# Patient Record
Sex: FEMALE | Race: WHITE | Employment: OTHER | URBAN - NONMETROPOLITAN AREA
[De-identification: names, ages, dates, MRNs, and addresses within clinical notes are randomized per-mention and may not be internally consistent; named-entity substitution may affect disease eponyms.]

---

## 2017-04-10 ENCOUNTER — HOSPITAL ENCOUNTER (OUTPATIENT)
Dept: VASCULAR LAB | Age: 77
Discharge: HOME | End: 2017-04-10
Payer: MEDICARE

## 2017-04-10 ENCOUNTER — OFFICE VISIT (OUTPATIENT)
Dept: VASCULAR SURGERY | Age: 77
End: 2017-04-10
Payer: MEDICARE

## 2017-04-10 VITALS
DIASTOLIC BLOOD PRESSURE: 64 MMHG | HEART RATE: 84 BPM | RESPIRATION RATE: 18 BRPM | SYSTOLIC BLOOD PRESSURE: 127 MMHG | TEMPERATURE: 96.6 F

## 2017-04-10 DIAGNOSIS — I65.23 BILATERAL CAROTID ARTERY STENOSIS: ICD-10-CM

## 2017-04-10 DIAGNOSIS — I65.23 BILATERAL CAROTID ARTERY STENOSIS: Primary | ICD-10-CM

## 2017-04-10 PROCEDURE — 1123F ACP DISCUSS/DSCN MKR DOCD: CPT | Performed by: PHYSICIAN ASSISTANT

## 2017-04-10 PROCEDURE — 99213 OFFICE O/P EST LOW 20 MIN: CPT | Performed by: PHYSICIAN ASSISTANT

## 2017-04-10 PROCEDURE — 93880 EXTRACRANIAL BILAT STUDY: CPT

## 2017-04-10 PROCEDURE — G8598 ASA/ANTIPLAT THER USED: HCPCS | Performed by: PHYSICIAN ASSISTANT

## 2017-04-10 PROCEDURE — 3017F COLORECTAL CA SCREEN DOC REV: CPT | Performed by: PHYSICIAN ASSISTANT

## 2017-04-10 PROCEDURE — 4040F PNEUMOC VAC/ADMIN/RCVD: CPT | Performed by: PHYSICIAN ASSISTANT

## 2017-04-10 PROCEDURE — G8400 PT W/DXA NO RESULTS DOC: HCPCS | Performed by: PHYSICIAN ASSISTANT

## 2017-04-10 PROCEDURE — G8421 BMI NOT CALCULATED: HCPCS | Performed by: PHYSICIAN ASSISTANT

## 2017-04-10 PROCEDURE — 1090F PRES/ABSN URINE INCON ASSESS: CPT | Performed by: PHYSICIAN ASSISTANT

## 2017-04-10 PROCEDURE — G8427 DOCREV CUR MEDS BY ELIG CLIN: HCPCS | Performed by: PHYSICIAN ASSISTANT

## 2017-04-10 PROCEDURE — 4004F PT TOBACCO SCREEN RCVD TLK: CPT | Performed by: PHYSICIAN ASSISTANT

## 2018-04-16 ENCOUNTER — HOSPITAL ENCOUNTER (OUTPATIENT)
Dept: VASCULAR LAB | Age: 78
Discharge: HOME | End: 2018-04-16
Payer: MEDICARE

## 2018-04-16 ENCOUNTER — OFFICE VISIT (OUTPATIENT)
Dept: VASCULAR SURGERY | Age: 78
End: 2018-04-16
Payer: MEDICARE

## 2018-04-16 VITALS
HEART RATE: 87 BPM | RESPIRATION RATE: 18 BRPM | DIASTOLIC BLOOD PRESSURE: 88 MMHG | BODY MASS INDEX: 23.56 KG/M2 | SYSTOLIC BLOOD PRESSURE: 158 MMHG | HEIGHT: 60 IN | WEIGHT: 120 LBS

## 2018-04-16 DIAGNOSIS — I65.23 BILATERAL CAROTID ARTERY STENOSIS: ICD-10-CM

## 2018-04-16 DIAGNOSIS — I65.23 BILATERAL CAROTID ARTERY STENOSIS: Primary | ICD-10-CM

## 2018-04-16 PROCEDURE — G8427 DOCREV CUR MEDS BY ELIG CLIN: HCPCS | Performed by: NURSE PRACTITIONER

## 2018-04-16 PROCEDURE — 99212 OFFICE O/P EST SF 10 MIN: CPT | Performed by: NURSE PRACTITIONER

## 2018-04-16 PROCEDURE — 93880 EXTRACRANIAL BILAT STUDY: CPT

## 2018-04-16 PROCEDURE — 4040F PNEUMOC VAC/ADMIN/RCVD: CPT | Performed by: NURSE PRACTITIONER

## 2018-04-16 PROCEDURE — 4004F PT TOBACCO SCREEN RCVD TLK: CPT | Performed by: NURSE PRACTITIONER

## 2018-04-16 PROCEDURE — 1090F PRES/ABSN URINE INCON ASSESS: CPT | Performed by: NURSE PRACTITIONER

## 2018-04-16 PROCEDURE — 1123F ACP DISCUSS/DSCN MKR DOCD: CPT | Performed by: NURSE PRACTITIONER

## 2018-04-16 PROCEDURE — G8400 PT W/DXA NO RESULTS DOC: HCPCS | Performed by: NURSE PRACTITIONER

## 2018-04-16 PROCEDURE — G8420 CALC BMI NORM PARAMETERS: HCPCS | Performed by: NURSE PRACTITIONER

## 2018-04-16 PROCEDURE — G8598 ASA/ANTIPLAT THER USED: HCPCS | Performed by: NURSE PRACTITIONER

## 2018-04-16 RX ORDER — LEVOTHYROXINE SODIUM 0.03 MG/1
75 TABLET ORAL DAILY
COMMUNITY

## 2018-04-16 RX ORDER — OLMESARTAN MEDOXOMIL AND HYDROCHLOROTHIAZIDE 40/25 40; 25 MG/1; MG/1
1 TABLET ORAL DAILY
COMMUNITY

## 2019-04-22 ENCOUNTER — TELEPHONE (OUTPATIENT)
Dept: VASCULAR SURGERY | Age: 79
End: 2019-04-22

## 2019-04-22 ENCOUNTER — HOSPITAL ENCOUNTER (OUTPATIENT)
Dept: VASCULAR LAB | Age: 79
Discharge: HOME | End: 2019-04-22
Payer: MEDICARE

## 2019-04-22 DIAGNOSIS — I65.23 BILATERAL CAROTID ARTERY STENOSIS: ICD-10-CM

## 2019-04-22 DIAGNOSIS — I65.23 BILATERAL CAROTID ARTERY STENOSIS: Primary | ICD-10-CM

## 2019-04-22 PROCEDURE — 93880 EXTRACRANIAL BILAT STUDY: CPT

## 2019-12-09 RX ORDER — LEVOTHYROXINE SODIUM 0.05 MG/1
50 TABLET ORAL DAILY
COMMUNITY

## 2019-12-09 RX ORDER — ROSUVASTATIN CALCIUM 10 MG/1
10 TABLET, COATED ORAL DAILY
COMMUNITY

## 2019-12-09 RX ORDER — OLMESARTAN MEDOXOMIL AND HYDROCHLOROTHIAZIDE 40/12.5 40; 12.5 MG/1; MG/1
1 TABLET ORAL DAILY
COMMUNITY

## 2019-12-16 NOTE — PROGRESS NOTES
Chief Complaint   Patient presents with   • Elevated Hepatic Enzymes     abdnormal lft's       PCP: Steffi Yu MD  REFER: Steffi Yu MD    Subjective     HPI    Patient referred to office for elevated LFT in Oct 2019.  Elevation of LFT was a new diagnosis for patient in Oct 2019.  States she has yearly lab work.  Occasional LUQ pain.  She has had decreased appetite and weight loss of 10-15 lb in last year.  Appetite is returning.  No heartburn or indigestion.  No bright red blood per rectum, no melena.  Bowels described as moving regular without difficulty.  Last colonoscopy over 10 years ago, she has been doing yearly stool study by PCP.  No family history liver disease.  No new medications/vitamins.  No etoh  Use.  Hepatitis testing negative by PCP.  Denies frequent use of NSAIDs.     Labs (referral note dated Oct 2019)  Ceruplasmin - elevated at 44      Alk PHos - 221  T Bili 0.4    Weight 120 lb in 4/2018    US dated Nov 2019 - pancreatic duct dilated 6 mm at pancreatic head    Past Medical History:   Diagnosis Date   • Disease of thyroid gland    • Hyperlipidemia    • Hypertension        Past Surgical History:   Procedure Laterality Date   • HYSTERECTOMY         Outpatient Medications Marked as Taking for the 12/17/19 encounter (Office Visit) with Gunnar Hope APRN   Medication Sig Dispense Refill   • levothyroxine (SYNTHROID, LEVOTHROID) 50 MCG tablet Take 50 mcg by mouth Daily.     • olmesartan-hydrochlorothiazide (BENICAR HCT) 40-12.5 MG per tablet Take 1 tablet by mouth Daily.     • rosuvastatin (CRESTOR) 10 MG tablet Take 10 mg by mouth Daily.     • traMADol (ULTRAM) 50 MG tablet TAKE ONE TABLET EVERY 8 HOURS AS NEEDED  0       Allergies   Allergen Reactions   • Accupril [Quinapril Hcl] Other (See Comments)     unsure   • Daypro [Oxaprozin] Other (See Comments)     unsure   • Norco [Hydrocodone-Acetaminophen] Other (See Comments)     unsure       Social History  "    Socioeconomic History   • Marital status:      Spouse name: Not on file   • Number of children: Not on file   • Years of education: Not on file   • Highest education level: Not on file   Tobacco Use   • Smoking status: Current Every Day Smoker     Packs/day: 0.25   • Smokeless tobacco: Never Used   • Tobacco comment: 7-8 cigaretts a day   Substance and Sexual Activity   • Alcohol use: Not Currently   • Drug use: Never       Family History   Problem Relation Age of Onset   • Colon cancer Neg Hx    • Colon polyps Neg Hx    • Esophageal cancer Neg Hx        Review of Systems   Constitutional: Negative for fatigue, fever and unexpected weight change.   HENT: Negative for hearing loss, sore throat and voice change.    Eyes: Negative for visual disturbance.   Respiratory: Negative for cough, shortness of breath and wheezing.    Cardiovascular: Negative for chest pain and palpitations.   Gastrointestinal: Positive for abdominal pain. Negative for blood in stool and vomiting.   Endocrine: Negative for polydipsia and polyuria.   Genitourinary: Negative for difficulty urinating, dysuria, hematuria and urgency.   Musculoskeletal: Negative for joint swelling and myalgias.   Skin: Negative for color change, rash and wound.   Neurological: Negative for dizziness, tremors, seizures and syncope.   Hematological: Does not bruise/bleed easily.   Psychiatric/Behavioral: Negative for agitation and confusion. The patient is not nervous/anxious.        Objective     Vitals:    12/17/19 1250   BP: 120/70   Pulse: 96   Temp: 97 °F (36.1 °C)   SpO2: 98%   Weight: 49 kg (108 lb)   Height: 152.4 cm (60\")     Body mass index is 21.09 kg/m².    Physical Exam   Constitutional: She is oriented to person, place, and time. She appears well-developed and well-nourished. She is cooperative.   HENT:   Head: Normocephalic and atraumatic.   Eyes: Pupils are equal, round, and reactive to light. Conjunctivae are normal. No scleral icterus. "   Neck: Normal range of motion. Neck supple. No JVD present. No thyroid mass and no thyromegaly present.   Cardiovascular: Normal rate, regular rhythm and normal heart sounds. Exam reveals no gallop and no friction rub.   No murmur heard.  Pulmonary/Chest: Effort normal and breath sounds normal. No accessory muscle usage. No respiratory distress. She has no wheezes. She has no rales.   Abdominal: Soft. Normal appearance and bowel sounds are normal. She exhibits no distension, no ascites and no mass. There is no hepatosplenomegaly. There is no tenderness. There is no rebound and no guarding.   Musculoskeletal: Normal range of motion. She exhibits no edema or tenderness.     Vascular Status -  Her right foot exhibits normal foot vasculature  and no edema. Her left foot exhibits normal foot vasculature  and no edema.  Lymphadenopathy:     She has no cervical adenopathy.   Neurological: She is alert and oriented to person, place, and time. She has normal strength. Gait normal.   Skin: Skin is warm, dry and intact. No rash noted.       Imaging Results (Most Recent)     None          Body mass index is 21.09 kg/m².    Assessment/Plan     Dionne was seen today for elevated hepatic enzymes.    Diagnoses and all orders for this visit:    Abnormal ultrasound  -     Cancel: MRI Abdomen With & Without Contrast; Future  -     MRI Abdomen With & Without Contrast; Future    Elevated LFTs  -     Alpha - 1 - Antitrypsin  -     Anti-Smooth Muscle Antibody Titer; Future  -     Ferritin  -     Iron Profile  -     Mitochondrial Antibodies, M2  -     Nuclear Antigen Antibody, IFA; Future    Abnormal finding of blood chemistry, unspecified   -     Ferritin  -     Iron Profile    Other orders  -     predniSONE (DELTASONE) 50 MG tablet; Take 1 tablet by mouth Take As Directed. One tablet 13 hours prior to procedure, one tablet 7 hours prior, one tablet one hour before  -     diphenhydrAMINE (BENADRYL ALLERGY) 25 MG tablet; Take 2 tablets by  mouth Take As Directed. Take one hour prior to procedure with last dose of prednisone        * Surgery not found *    Patient states she has had reaction to MRI many years ago and was pre medicated prior to last MRI in 2016 and did not have difficulty.    Case discussed with Dr Hudson, treatment plan agree upon  Further orders/recommendation pending finding of MRI/lab  I do recommend colonoscopy evaluation, however, timing to be discussed after results of testing      There are no Patient Instructions on file for this visit.

## 2019-12-17 ENCOUNTER — LAB (OUTPATIENT)
Dept: LAB | Facility: HOSPITAL | Age: 79
End: 2019-12-17

## 2019-12-17 ENCOUNTER — OFFICE VISIT (OUTPATIENT)
Dept: GASTROENTEROLOGY | Facility: CLINIC | Age: 79
End: 2019-12-17

## 2019-12-17 VITALS
SYSTOLIC BLOOD PRESSURE: 120 MMHG | OXYGEN SATURATION: 98 % | DIASTOLIC BLOOD PRESSURE: 70 MMHG | HEIGHT: 60 IN | TEMPERATURE: 97 F | HEART RATE: 96 BPM | BODY MASS INDEX: 21.2 KG/M2 | WEIGHT: 108 LBS

## 2019-12-17 DIAGNOSIS — R79.89 ELEVATED LFTS: ICD-10-CM

## 2019-12-17 DIAGNOSIS — R93.89 ABNORMAL ULTRASOUND: Primary | ICD-10-CM

## 2019-12-17 DIAGNOSIS — R79.9 ABNORMAL FINDING OF BLOOD CHEMISTRY, UNSPECIFIED: ICD-10-CM

## 2019-12-17 PROCEDURE — 99204 OFFICE O/P NEW MOD 45 MIN: CPT | Performed by: NURSE PRACTITIONER

## 2019-12-17 PROCEDURE — 84466 ASSAY OF TRANSFERRIN: CPT | Performed by: NURSE PRACTITIONER

## 2019-12-17 PROCEDURE — 36415 COLL VENOUS BLD VENIPUNCTURE: CPT | Performed by: NURSE PRACTITIONER

## 2019-12-17 PROCEDURE — 83516 IMMUNOASSAY NONANTIBODY: CPT | Performed by: NURSE PRACTITIONER

## 2019-12-17 PROCEDURE — 82103 ALPHA-1-ANTITRYPSIN TOTAL: CPT | Performed by: NURSE PRACTITIONER

## 2019-12-17 PROCEDURE — 83540 ASSAY OF IRON: CPT | Performed by: NURSE PRACTITIONER

## 2019-12-17 PROCEDURE — 82728 ASSAY OF FERRITIN: CPT | Performed by: NURSE PRACTITIONER

## 2019-12-17 PROCEDURE — 86038 ANTINUCLEAR ANTIBODIES: CPT | Performed by: NURSE PRACTITIONER

## 2019-12-17 RX ORDER — PREDNISONE 50 MG/1
50 TABLET ORAL TAKE AS DIRECTED
Qty: 3 TABLET | Refills: 0 | Status: SHIPPED | OUTPATIENT
Start: 2019-12-17

## 2019-12-17 RX ORDER — DIPHENHYDRAMINE HCL 25 MG
50 TABLET ORAL TAKE AS DIRECTED
Qty: 2 TABLET | Refills: 0 | Status: SHIPPED | OUTPATIENT
Start: 2019-12-17

## 2019-12-17 RX ORDER — TRAMADOL HYDROCHLORIDE 50 MG/1
TABLET ORAL
Refills: 0 | COMMUNITY
Start: 2019-11-05

## 2019-12-18 LAB
ACTIN IGG SERPL-ACNC: 6 UNITS (ref 0–19)
ALPHA1 GLOB MFR UR ELPH: 187 MG/DL (ref 90–200)
DEPRECATED MITOCHONDRIA M2 IGG SER-ACNC: <20 UNITS (ref 0–20)
FERRITIN SERPL-MCNC: 143 NG/ML (ref 13–150)
IRON 24H UR-MRATE: 56 MCG/DL (ref 37–145)
IRON SATN MFR SERPL: 13 % (ref 20–50)
TIBC SERPL-MCNC: 431 MCG/DL (ref 298–536)
TRANSFERRIN SERPL-MCNC: 289 MG/DL (ref 200–360)

## 2019-12-19 ENCOUNTER — HOSPITAL ENCOUNTER (OUTPATIENT)
Dept: MRI IMAGING | Facility: HOSPITAL | Age: 79
Discharge: HOME | End: 2019-12-19
Admitting: NURSE PRACTITIONER

## 2019-12-19 DIAGNOSIS — R93.89 ABNORMAL ULTRASOUND: ICD-10-CM

## 2019-12-19 LAB
ANA SER QL IA: NEGATIVE
CREAT BLDA-MCNC: 0.8 MG/DL (ref 0.6–1.3)

## 2019-12-19 PROCEDURE — 74183 MRI ABD W/O CNTR FLWD CNTR: CPT

## 2019-12-19 PROCEDURE — A9577 INJ MULTIHANCE: HCPCS | Performed by: NURSE PRACTITIONER

## 2019-12-19 PROCEDURE — 82565 ASSAY OF CREATININE: CPT

## 2019-12-19 PROCEDURE — 0 GADOBENATE DIMEGLUMINE 529 MG/ML SOLUTION: Performed by: NURSE PRACTITIONER

## 2019-12-19 RX ADMIN — GADOBENATE DIMEGLUMINE 10 ML: 529 INJECTION, SOLUTION INTRAVENOUS at 11:53

## 2019-12-20 ENCOUNTER — TELEPHONE (OUTPATIENT)
Dept: GASTROENTEROLOGY | Facility: CLINIC | Age: 79
End: 2019-12-20

## 2019-12-20 NOTE — PROGRESS NOTES
Think she will need an OV with me (hopefully next Monday) to discuss her recent MRI/elevated LFT's  Could u check with her and add her into my schedule?

## 2019-12-20 NOTE — TELEPHONE ENCOUNTER
----- Message from Tad Hudson, DO sent at 12/20/2019  7:18 AM CST -----  Think she will need an OV with me (hopefully next Monday) to discuss her recent MRI/elevated LFT's  Could u check with her and add her into my schedule?

## 2019-12-23 ENCOUNTER — OFFICE VISIT (OUTPATIENT)
Dept: GASTROENTEROLOGY | Facility: CLINIC | Age: 79
End: 2019-12-23

## 2019-12-23 VITALS
HEIGHT: 60 IN | HEART RATE: 94 BPM | WEIGHT: 107 LBS | OXYGEN SATURATION: 99 % | DIASTOLIC BLOOD PRESSURE: 82 MMHG | SYSTOLIC BLOOD PRESSURE: 130 MMHG | BODY MASS INDEX: 21.01 KG/M2

## 2019-12-23 DIAGNOSIS — R63.4 WEIGHT LOSS: ICD-10-CM

## 2019-12-23 DIAGNOSIS — R93.89 ABNORMAL MRI: ICD-10-CM

## 2019-12-23 DIAGNOSIS — R79.89 ELEVATED LFTS: Primary | ICD-10-CM

## 2019-12-23 PROCEDURE — 99214 OFFICE O/P EST MOD 30 MIN: CPT | Performed by: INTERNAL MEDICINE

## 2019-12-23 NOTE — PROGRESS NOTES
Chief Complaint   Patient presents with   • GI Problem     elevated LFTS/discuss MRI results       PCP: Steffi Yu MD  REFER: No ref. provider found    Subjective     HPI    Eval in office 12/17/19 for abnormal US and new finding of elevation in LFT.  Follow up MRI showed dilation of pancreatic duct.   No etoh use.  Cigarette use daily.  No abdominal pain. No nausea or vomiting.  Wt reported at 120 lb in 4/2018.  Further liver serology negative.     Labs (referral note dated Oct 2019)  Ceruplasmin - elevated at 44      Alk PHos - 221  T Bili 0.4      US dated Nov 2019 - pancreatic duct dilated 6 mm at pancreatic head    Past Medical History:   Diagnosis Date   • Disease of thyroid gland    • Hyperlipidemia    • Hypertension        Past Surgical History:   Procedure Laterality Date   • HYSTERECTOMY         Outpatient Medications Marked as Taking for the 12/23/19 encounter (Office Visit) with Tad Hudson, DO   Medication Sig Dispense Refill   • levothyroxine (SYNTHROID, LEVOTHROID) 50 MCG tablet Take 50 mcg by mouth Daily.     • olmesartan-hydrochlorothiazide (BENICAR HCT) 40-12.5 MG per tablet Take 1 tablet by mouth Daily.     • rosuvastatin (CRESTOR) 10 MG tablet Take 10 mg by mouth Daily.         Allergies   Allergen Reactions   • Accupril [Quinapril Hcl] Other (See Comments)     unsure   • Contrast Dye Other (See Comments)     PT states she had reaction years ago-turned bright red   • Daypro [Oxaprozin] Other (See Comments)     unsure   • Norco [Hydrocodone-Acetaminophen] Other (See Comments)     unsure       Social History     Socioeconomic History   • Marital status:      Spouse name: Not on file   • Number of children: Not on file   • Years of education: Not on file   • Highest education level: Not on file   Tobacco Use   • Smoking status: Current Every Day Smoker     Packs/day: 0.25   • Smokeless tobacco: Never Used   • Tobacco comment: 7-8 cigaretts a day   Substance and  "Sexual Activity   • Alcohol use: Not Currently   • Drug use: Never       Family History   Problem Relation Age of Onset   • Colon cancer Neg Hx    • Colon polyps Neg Hx    • Esophageal cancer Neg Hx        Review of Systems   Constitutional: Positive for unexpected weight change. Negative for fatigue and fever.   HENT: Negative for hearing loss, sore throat and voice change.    Eyes: Negative for visual disturbance.   Respiratory: Negative for cough, shortness of breath and wheezing.    Cardiovascular: Negative for chest pain and palpitations.   Gastrointestinal: Negative for abdominal pain, blood in stool and vomiting.   Endocrine: Negative for polydipsia and polyuria.   Genitourinary: Negative for difficulty urinating, dysuria, hematuria and urgency.   Musculoskeletal: Negative for joint swelling and myalgias.   Skin: Negative for color change, rash and wound.   Neurological: Negative for dizziness, tremors, seizures and syncope.   Hematological: Does not bruise/bleed easily.   Psychiatric/Behavioral: Negative for agitation and confusion. The patient is not nervous/anxious.        Objective     Vitals:    12/23/19 1317   BP: 130/82   Pulse: 94   SpO2: 99%   Weight: 48.5 kg (107 lb)   Height: 152.4 cm (60\")     Body mass index is 20.9 kg/m².    Physical Exam   Constitutional: She is oriented to person, place, and time. She appears well-developed and well-nourished. She is cooperative.   HENT:   Head: Normocephalic and atraumatic.   Eyes: Pupils are equal, round, and reactive to light. Conjunctivae are normal. No scleral icterus.   Neck: Normal range of motion. Neck supple. No JVD present. No thyroid mass and no thyromegaly present.   Cardiovascular: Normal rate, regular rhythm and normal heart sounds. Exam reveals no gallop and no friction rub.   No murmur heard.  Pulmonary/Chest: Effort normal and breath sounds normal. No accessory muscle usage. No respiratory distress. She has no wheezes. She has no rales. "   Abdominal: Soft. Normal appearance and bowel sounds are normal. She exhibits no distension, no ascites and no mass. There is no hepatosplenomegaly. There is no tenderness. There is no rebound and no guarding.   Musculoskeletal: Normal range of motion. She exhibits no edema or tenderness.     Vascular Status -  Her right foot exhibits normal foot vasculature  and no edema. Her left foot exhibits normal foot vasculature  and no edema.  Lymphadenopathy:     She has no cervical adenopathy.   Neurological: She is alert and oriented to person, place, and time. She has normal strength. Gait normal.   Skin: Skin is warm, dry and intact. No rash noted.       Imaging Results (Most Recent)     None          Body mass index is 20.9 kg/m².    Assessment/Plan     Dionne was seen today for gi problem.    Diagnoses and all orders for this visit:    Elevated LFTs    Abnormal MRI  -     Ambulatory Referral to Gastroenterology    Weight loss      Explained a mass was not visualized on MRI, however, with dilation of ducts and abnormal labs recommend EUS-will refer to Dr Lazaro  Strongly encourage smoking cessation    * Surgery not found *          There are no Patient Instructions on file for this visit.

## 2020-01-24 ENCOUNTER — TELEPHONE (OUTPATIENT)
Dept: GASTROENTEROLOGY | Facility: CLINIC | Age: 80
End: 2020-01-24

## 2020-01-24 NOTE — TELEPHONE ENCOUNTER
Note from dr omreira office dated 1/14/2020    Referral for elevated LFT   Abnormal MRI    Anticipate EUS

## 2020-01-31 ENCOUNTER — TELEPHONE (OUTPATIENT)
Dept: GASTROENTEROLOGY | Facility: CLINIC | Age: 80
End: 2020-01-31

## 2020-03-23 ENCOUNTER — TELEPHONE (OUTPATIENT)
Dept: HEMATOLOGY | Age: 80
End: 2020-03-23

## 2020-04-03 RX ORDER — FAMOTIDINE 10 MG
10 TABLET ORAL 2 TIMES DAILY
COMMUNITY

## 2020-04-27 ENCOUNTER — VIRTUAL VISIT (OUTPATIENT)
Dept: VASCULAR SURGERY | Age: 80
End: 2020-04-27
Payer: MEDICARE

## 2020-04-27 PROBLEM — C80.1 CANCER (HCC): Status: ACTIVE | Noted: 2020-04-27

## 2020-04-27 PROCEDURE — 99441 PR PHYS/QHP TELEPHONE EVALUATION 5-10 MIN: CPT | Performed by: NURSE PRACTITIONER

## 2020-04-27 NOTE — PROGRESS NOTES
ABDOMINAL      PANCREAS SURGERY      whipple    SKIN CANCER EXCISION      right arm     Family History   Problem Relation Age of Onset    Other Mother         Pneumonia,  of   Heddie Catena Hypertension Father     Other Father          of cerebral bleed    Other Sister          of pneumonia     Social History     Tobacco Use    Smoking status: Current Every Day Smoker     Packs/day: 0.50     Years: 47.00     Pack years: 23.50    Smokeless tobacco: Never Used    Tobacco comment: now smoking 5-7 cigs per day x 1 year   Substance Use Topics    Alcohol use: No         Review of Systems    Constitutional - no significant activity change, appetite change, or unexpected weight change. No fever or chills. No diaphoresis or significant fatigue. HENT - no significant rhinorrhea or epistaxis. No tinnitus or significant hearing loss. Eyes - no sudden vision change or amaurosis. Respiratory - no significant shortness of breath, wheezing, or stridor. No apnea, cough, or chest tightness associated with shortness of breath. Cardiovascular - no chest pain, syncope, or significant dizziness. No palpitations or significant leg swelling. No claudication. Gastrointestinal - has not had abdominal swelling or pain. No blood in stool. No severe constipation, diarrhea, nausea, or vomiting. Genitourinary - No difficulty urinating, dysuria, frequency, or urgency. No flank pain or hematuria. Musculoskeletal - has not had back pain, gait disturbance, or myalgia. Skin - no color change, rash, pallor, or new wound. Neurologic - no dizziness, facial asymmetry, or light headedness. No seizures. No speech difficulty or lateralizing weakness. Hematologic - no easy bruising or excessive bleeding. Psychiatric - no severe anxiety or nervousness. No confusion. All other review of systems are negative.     PHYSICAL EXAMINATION:    [ INSTRUCTIONS:  \"[x]\" Indicates a positive item  \"[]\" Indicates a negative item  -- DELETE

## 2020-06-01 ENCOUNTER — VIRTUAL VISIT (OUTPATIENT)
Dept: VASCULAR SURGERY | Age: 80
End: 2020-06-01
Payer: MEDICARE

## 2020-06-01 ENCOUNTER — TELEPHONE (OUTPATIENT)
Dept: VASCULAR SURGERY | Age: 80
End: 2020-06-01

## 2020-06-01 PROCEDURE — 99441 PR PHYS/QHP TELEPHONE EVALUATION 5-10 MIN: CPT | Performed by: NURSE PRACTITIONER

## 2020-06-01 NOTE — PROGRESS NOTES
Robert Schmidt is a 79 y.o. female evaluated via telephone on 6/1/2020. Consent:  She and/or health care decision maker is aware that that she may receive a bill for this telephone service, depending on her insurance coverage, and has provided verbal consent to proceed: Yes    Patient is located at home  Provider is located at [facility]   Also present during call is     She presents for follow up of carotid artery stenosis. She has a known history of carotid artery stenosis for 1 - 5 years. Her current treatment includes ASA EC daily. She denies a history of CVA. She reports no TIA's, episodes of lateralizing weakness and episodes of amaurosis fugax. Robert Schmidt is a 79 y.o. female with the following history reviewed and recorded in [employer]:  Patient Active Problem List    Diagnosis Date Noted    Cancer ([facility] 04/27/2020     Bile duct, cancer, liver      Res[facility] ([facility] 03/10/2014    Carotid artery stenosis 03/07/2013    Hypertension     Substance abuse ([facility])      tobacco       Current Outpatient Medications   Medication Sig Dispense Refill    famotidine (PEPCID) 10 MG tablet Take 10 mg by mouth 2 times daily      olmesartan-hydrochlorothiazide (BENICAR HCT) 40-25 MG per tablet Take 1 tablet by mouth daily      levothyroxine (SYNTHROID) 25 MCG tablet Take 50 mcg by mouth Daily       aspirin 81 MG EC tablet Take 81 mg by mouth daily.  rosuvastatin (CRESTOR) 10 MG tablet Take 10 mg by mouth daily. No current facility-administered medications for this visit. Allergies: Daypro [oxaprozin];  Iodides; and Quinapril hcl  Past Medical History:   Diagnosis Date    Anemia     Carotid artery occlusion     Hyperlipidemia     Hypertension     Respiratory failure ([facility] 3/10/2014    Substance abuse ([facility]     tobacco     Past Surgical History:   Procedure Laterality Date    CAROTID ENDARTERECTOMY  [address], 03/09/09    LCE w/Dacron patch angioplasty    HYSTERECTOMY, negative item  -- DELETE ALL ITEMS NOT EXAMINED]    [x] Alert  [x] Oriented to person/place/time    [x] No apparent distress  [] Toxic appearing  [x] Normal Mood  [] Anxious appearing    [] Depressed appearing  [] Confused appearing      [] Poor short term memory  [] Poor long term memory   Memory appears to be intact           Assessment    1. Bilateral carotid artery stenosis          Plan      Strongly encouraged start/continue statin therapy  Recommended no smoking  Patient instructed to call or proceed to the emergency room with any symptoms of lateralizing weakness, loss of vision in one eye, or episodes slurred speech. Will hold off on study due to covid  Asa ec daily  Follow up in 6 months  Documentation:  I communicated with the patient and/or health care decision maker about carotid artery stenosis. Details of this discussion including any medical advice provided: as above      I affirm this is a Patient Initiated Episode with an Established Patient who has not had a related appointment within my department in the past 7 days or scheduled within the next 24 hours.     Total Time: minutes: 5-10 minutes    Note: not billable if this call serves to triage the patient into an appointment for the relevant concern      [facility]

## 2020-09-01 ENCOUNTER — OFFICE VISIT (OUTPATIENT)
Dept: HEMATOLOGY | Age: 80
End: 2020-09-01
Payer: MEDICARE

## 2020-09-01 ENCOUNTER — HOSPITAL ENCOUNTER (OUTPATIENT)
Dept: INFUSION THERAPY | Age: 80
Discharge: HOME | End: 2020-09-01
Payer: MEDICARE

## 2020-09-01 ENCOUNTER — CLINICAL DOCUMENTATION (OUTPATIENT)
Dept: HEMATOLOGY | Age: 80
End: 2020-09-01

## 2020-09-01 VITALS
SYSTOLIC BLOOD PRESSURE: 120 MMHG | BODY MASS INDEX: 18.65 KG/M2 | HEIGHT: 61 IN | HEART RATE: 71 BPM | OXYGEN SATURATION: 98 % | WEIGHT: 98.8 LBS | TEMPERATURE: 97.1 F | DIASTOLIC BLOOD PRESSURE: 82 MMHG

## 2020-09-01 DIAGNOSIS — C80.1 CANCER (HCC): ICD-10-CM

## 2020-09-01 DIAGNOSIS — C25.9 PANCREATIC ADENOCARCINOMA (HCC): ICD-10-CM

## 2020-09-01 LAB
ALBUMIN SERPL-MCNC: 4.6 G/DL (ref 3.5–5.2)
ALP BLD-CCNC: 95 U/L (ref 35–104)
ALT SERPL-CCNC: 31 U/L (ref 9–52)
ANION GAP SERPL CALCULATED.3IONS-SCNC: 11 MMOL/L (ref 7–19)
AST SERPL-CCNC: 37 U/L (ref 14–36)
BASOPHILS ABSOLUTE: 0.04 K/UL (ref 0.01–0.08)
BASOPHILS RELATIVE PERCENT: 0.4 % (ref 0.1–1.2)
BILIRUB SERPL-MCNC: 0.5 MG/DL (ref 0.2–1.3)
BUN BLDV-MCNC: 21 MG/DL (ref 7–17)
CA 19-9: 1 U/ML (ref 0–37)
CALCIUM SERPL-MCNC: 10.3 MG/DL (ref 8.4–10.2)
CEA: 4.9 NG/ML (ref 0–3)
CHLORIDE BLD-SCNC: 103 MMOL/L (ref 98–111)
CO2: 29 MMOL/L (ref 22–29)
CREAT SERPL-MCNC: 0.8 MG/DL (ref 0.5–1)
EOSINOPHILS ABSOLUTE: 0.06 K/UL (ref 0.04–0.54)
EOSINOPHILS RELATIVE PERCENT: 0.6 % (ref 0.7–7)
GFR NON-AFRICAN AMERICAN: >60
GLOBULIN: 3.6 G/DL
GLUCOSE BLD-MCNC: 113 MG/DL (ref 74–106)
HCT VFR BLD CALC: 41.1 % (ref 34.1–44.9)
HEMOGLOBIN: 13.2 G/DL (ref 11.2–15.7)
LYMPHOCYTES ABSOLUTE: 2.51 K/UL (ref 1.18–3.74)
LYMPHOCYTES RELATIVE PERCENT: 24.2 % (ref 19.3–53.1)
MCH RBC QN AUTO: 29.7 PG (ref 25.6–32.2)
MCHC RBC AUTO-ENTMCNC: 32.1 G/DL (ref 32.3–35.5)
MCV RBC AUTO: 92.6 FL (ref 79.4–94.8)
MONOCYTES ABSOLUTE: 0.91 K/UL (ref 0.24–0.82)
MONOCYTES RELATIVE PERCENT: 8.8 % (ref 4.7–12.5)
NEUTROPHILS ABSOLUTE: 6.87 K/UL (ref 1.56–6.13)
NEUTROPHILS RELATIVE PERCENT: 66 % (ref 34–71.1)
PDW BLD-RTO: 17.4 % (ref 11.7–14.4)
PLATELET # BLD: 409 K/UL (ref 182–369)
PMV BLD AUTO: 9.5 FL (ref 7.4–10.4)
POTASSIUM SERPL-SCNC: 4 MMOL/L (ref 3.5–5.1)
RBC # BLD: 4.44 M/UL (ref 3.93–5.22)
SODIUM BLD-SCNC: 143 MMOL/L (ref 137–145)
TOTAL PROTEIN: 8.2 G/DL (ref 6.3–8.2)
WBC # BLD: 10.39 K/UL (ref 3.98–10.04)

## 2020-09-01 PROCEDURE — 80053 COMPREHEN METABOLIC PANEL: CPT

## 2020-09-01 PROCEDURE — 4040F PNEUMOC VAC/ADMIN/RCVD: CPT | Performed by: INTERNAL MEDICINE

## 2020-09-01 PROCEDURE — G8400 PT W/DXA NO RESULTS DOC: HCPCS | Performed by: INTERNAL MEDICINE

## 2020-09-01 PROCEDURE — 99205 OFFICE O/P NEW HI 60 MIN: CPT | Performed by: INTERNAL MEDICINE

## 2020-09-01 PROCEDURE — 1090F PRES/ABSN URINE INCON ASSESS: CPT | Performed by: INTERNAL MEDICINE

## 2020-09-01 PROCEDURE — 4004F PT TOBACCO SCREEN RCVD TLK: CPT | Performed by: INTERNAL MEDICINE

## 2020-09-01 PROCEDURE — 86301 IMMUNOASSAY TUMOR CA 19-9: CPT

## 2020-09-01 PROCEDURE — 85025 COMPLETE CBC W/AUTO DIFF WBC: CPT

## 2020-09-01 PROCEDURE — G8428 CUR MEDS NOT DOCUMENT: HCPCS | Performed by: INTERNAL MEDICINE

## 2020-09-01 PROCEDURE — 1123F ACP DISCUSS/DSCN MKR DOCD: CPT | Performed by: INTERNAL MEDICINE

## 2020-09-01 PROCEDURE — 99212 OFFICE O/P EST SF 10 MIN: CPT

## 2020-09-01 PROCEDURE — 82378 CARCINOEMBRYONIC ANTIGEN: CPT

## 2020-09-01 PROCEDURE — G8420 CALC BMI NORM PARAMETERS: HCPCS | Performed by: INTERNAL MEDICINE

## 2020-09-01 RX ORDER — DIPHENHYDRAMINE HCL 50 MG
CAPSULE ORAL
Qty: 1 CAPSULE | Refills: 0 | Status: SHIPPED | OUTPATIENT
Start: 2020-09-01 | End: 2021-06-08 | Stop reason: ALTCHOICE

## 2020-09-01 RX ORDER — PREDNISONE 50 MG/1
TABLET ORAL
Qty: 3 TABLET | Refills: 0 | Status: SHIPPED | OUTPATIENT
Start: 2020-09-01 | End: 2021-06-08 | Stop reason: ALTCHOICE

## 2020-09-01 NOTE — PROGRESS NOTES
Prednisone and Benadryl scripts sent to [facility] for CT scan prep due to allergy to IV dye.

## 2020-09-01 NOTE — PROGRESS NOTES
Patient:  Saima Green  YOB: 1940  Date of Service: 9/1/2020  MRN: [id number]   Primary Care Physician: Saritha Barrientos. 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 Subjective: Saima Green is a 79 y.o.  female referred by Dr. Zainab Tilley for opinion regarding adjuvant therapy with a diagnosis of resected stage IIA pancreatic cancer 2/25/2020 for adjuvant systemic chemotherapy considerations. 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 Zainab Tilley for opinion regarding adjuvant therapy with a diagnosis of resected stage IIA pancreatic cancer 2/25/2020 for adjuvant systemic chemotherapy considerations. Dr. Zainab Tilley had referred her to this office in March 2020. Our office tried to schedule her for an appointment on 3/23/2020 but Maxx Owens refused. When she presented today, she admitted that she did not feel good and at that time that she was originally referred and was convinced that she did not desire to have systemic chemotherapy and did not see the utility of making an appointment. Additionally, she was frightened during the first part of the COVID-19 pandemic and therefore declined to come for evaluation or opinion. Ms. Clement Euceda presents today, 9/1/2020, referred again by Dr. Zainab Tilley for medical oncology opinion regarding adjuvant systemic chemotherapy and medical oncology monitoring and opinion.     Her history is as follows:     US abdomen on 11/5/2019 at [facility] documented:  · Inhomogeneous appearance of liver without focal lesion  · Intra and extrahepatic biliary ductal dilatation   · Pancreatic duct dilated up to 5 mm. These findings might be further evaluated with MRCP/ERCP as deemed clinically warranted  · Sludge in the gallbladder  · Subcentimeter right renal cyst    MRI abdomen with and without contrast on 12/19/2019 at [facility] documented:  · Intra and extrahepatic biliary ductal dilatation with dilatation of the pancreatic duct. Etiology of the duct dilatation is uncertain, no definite distal duct lesion identified on this examination. . Conventional ERCP suggested versus CT imaging, pancreas mass protocol. · Gallbladder sludge. Small left nephrogenic cyst.    Biopsy of the ampulla mass was performed on 1/27/2020. Pathology revealed moderately differentiated adenocarcinoma. Whipple was performed on 2/25/2020 by Dr. 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 · Extends through muscularis propria into the clayton duodenal soft tissue  · Pancreas and common bile duct negative for dysplasia and malignancy  · Proximal, distal common bile duct, distal pancreas and retroperitoneal margins negative for malignancy  · 12 lymph nodes negative for malignancy    AJCC staging pT3, pN0, Mx, G2, moderately differentiated pancreaticobiliary adenocarcinoma    Physical examination at her initial visit on 9/1/2020 documents a very small but healthy appearing 98 pound woman in no acute distress. She does not have evidence of palpable supraclavicular infraclavicular axillary or inguinal lymphadenopathy.   Lungs are clear heart is regular abdomen is soft and benign with a or sore throat. Eyes: Negative for photophobia, pain, discharge, redness and visual disturbance. Respiratory: Negative for cough, shortness of breath, or wheezing. Cardiovascular: Negative for chest pain, palpitations or leg swelling. Gastrointestinal: Negative for abdominal pain, blood in stool, constipation, diarrhea, nausea or vomiting. Genitourinary: Negative for dysuria, flank pain, frequency, hematuria or urgency. Musculoskeletal: Negative for back pain, joint swelling, myalgias or neck pain. Skin: Negative for rash or petechiae. Neurological: Negative for tremors, seizures, syncope, weakness or headaches. Hematological: No active bruising or bleeding. Psychiatric/Behavioral: Negative for hallucinations. Objective:  Blood pressure 120/82, pulse 71, temperature 97.1 °F (36.2 °C), height 5' 0.5\" (1.537 m), weight 98 lb 12.8 oz (44.8 kg), SpO2 98 %. Physical Exam   Constitutional: Oriented to person, place, and time. No acute distress. Head: Normocephalic and atraumatic. Nose: Nose normal.   Mouth/Throat: Oropharynx is clear and moist. No oropharyngeal exudate. Eyes: Pupils are equal and round. Conjunctivae and EOM are normal. No scleral icterus. Neck: Normal range of motion. Neck supple. No JVD. No appreciable thyromegaly. Cardiovascular: Normal rate, regular rhythm, normal heart sounds and intact distal pulses. Exam reveals no gallop, murmurs or friction rub. Pulmonary/Chest: Effort normal and breath sounds normal. No respiratory distress. No wheezes. Abdominal: Soft. Bowel sounds are normal. No organomegally or masses. No tenderness. There is no rebound and no guarding. Musculoskeletal: Normal range of motion. No edema or tenderness. Lymphadenopathy: No cervical, axillary or inguinal lymphadenopathy. Neurological: Alert and oriented to person, place, and time. Cranial nerves are intact. Neurological exam is nonfocal  Skin: Skin is warm and dry. No rash noted. appearance of liver without focal lesion  · Intra and extrahepatic biliary ductal dilatation   · Pancreatic duct dilated up to 5 mm. These findings might be further evaluated with MRCP/ERCP as deemed clinically warranted  · Sludge in the gallbladder  · Subcentimeter right renal cyst    MRI abdomen with and without contrast on 12/19/[facility] documented:  · Intra and extrahepatic biliary ductal dilatation with dilatation of the pancreatic duct. Etiology of the duct dilatation is uncertain, no definite distal duct lesion identified on this examination. . Conventional ERCP suggested versus CT imaging, pancreas mass protocol. · Gallbladder sludge. Small left nephrogenic cyst.    Biopsy of the ampulla mass was performed on 1/27/2020. Pathology revealed moderately differentiated adenocarcinoma. Whipple was performed on 2/25/2020 by Dr. 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 · Extends through muscularis propria into the clay[place] duodenal soft tissue  · Pancreas and common bile duct negative for dysplasia and malignancy  · Proximal, distal common bile duct, distal pancreas and retroperitoneal margins negative for malignancy  · 12 lymph nodes negative for malignancy    AJCC staging pT3, pN0, Mx, G2, moderately differentiated pancreaticobiliary adenocarcinoma    Physical examination at her initial visit on 9/1/2020 documents a very small but healthy appearing 98 pound woman in no acute distress. She does not have evidence of palpable supraclavicular infraclavicular axillary or inguinal lymphadenopathy.   Lungs are clear heart is regular abdomen is soft and benign with a well-healed pancreatectomy scar. CBC today (2020) reveals a WBC of 10.39. Hgb is 13.2 with an MCV of 92.6 and platelet count of 630,260. ASSESSMENT RECOMMENDATION AND PLAN:  · CT scan of the chest abdomen and pelvis  · Tumor markers with CEA and CA 19-9  · Follow-up appointment to review results of scans. · During the oncology interview, Kristen Cortes is fairly strongly opinionated that she would not desire systemic chemotherapy in the adjuvant setting. This will be discussed again at her follow-up visit      Kristen Cortes was seen today for new patient. Diagnoses and all orders for this visit:    Pancreatic adenocarcinoma ([facility] [address]  -     Comprehensive Metabolic Panel; Future  -     CEA; Future  -     Cancel: Cancer Antigen 19-9; Future  -     CT CHEST W CONTRAST; Future  -     CT ABDOMEN PELVIS W IV CONTRAST Additional Contrast? Oral; Future    Other orders  -     predniSONE (DELTASONE) 50 MG tablet; 1 tablet by mouth 24, 12 and 1 hour before CT scan  -     diphenhydrAMINE (BENADRYL) 50 MG capsule; 1 capsule by mouth to be taken with last Prednisone dose 1 hour before procedure        Return in about 3 weeks (around 2020) for f/u Dr. Mariza Bah.        Orders Placed This Encounter   Procedures    CT CHEST W CONTRAST    CT ABDOMEN PELVIS W IV CONTRAST Additional Contrast? Oral    Comprehensive Metabolic Panel    CEA       Orders Placed This Encounter   Medications    predniSONE (DELTASONE) 50 MG tablet     Si tablet by mouth 24, 12 and 1 hour before CT scan     Dispense:  3 tablet     Refill:  0    diphenhydrAMINE (BENADRYL) 50 MG capsule     Si capsule by mouth to be taken with last Prednisone dose 1 hour before procedure     Dispense:  1 capsule     Refill:  0       Lala DUMONT LPN am scribing for Isabel Baptiste MD. Electronically signed by Chantal Reynoso LPN on [id number] at 8:22 PM     IDr. Laura, personally performed the services described in this documentation as scribed by Tami Kumar LPN in my presence, and it is both accurate and complete. Thank you for the consult, we appreciate the opportunity to provide care to your patients. Feel free to contact me if I can be of any further assistance.

## 2020-09-09 ENCOUNTER — HOSPITAL ENCOUNTER (OUTPATIENT)
Dept: CT IMAGING | Age: 80
Discharge: HOME | End: 2020-09-09
Payer: MEDICARE

## 2020-09-09 PROCEDURE — 74177 CT ABD & PELVIS W/CONTRAST: CPT

## 2020-09-09 PROCEDURE — 6360000004 HC RX CONTRAST MEDICATION: Performed by: INTERNAL MEDICINE

## 2020-09-09 PROCEDURE — 71260 CT THORAX DX C+: CPT

## 2020-09-09 RX ADMIN — IOPAMIDOL 75 ML: 755 INJECTION, SOLUTION INTRAVENOUS at 11:35

## 2020-09-22 ENCOUNTER — VIRTUAL VISIT (OUTPATIENT)
Dept: HEMATOLOGY | Age: 80
End: 2020-09-22
Payer: MEDICARE

## 2020-09-22 ENCOUNTER — TELEPHONE (OUTPATIENT)
Dept: HEMATOLOGY | Age: 80
End: 2020-09-22

## 2020-09-22 PROCEDURE — 1090F PRES/ABSN URINE INCON ASSESS: CPT | Performed by: INTERNAL MEDICINE

## 2020-09-22 PROCEDURE — G8427 DOCREV CUR MEDS BY ELIG CLIN: HCPCS | Performed by: INTERNAL MEDICINE

## 2020-09-22 PROCEDURE — 1123F ACP DISCUSS/DSCN MKR DOCD: CPT | Performed by: INTERNAL MEDICINE

## 2020-09-22 PROCEDURE — 99214 OFFICE O/P EST MOD 30 MIN: CPT | Performed by: INTERNAL MEDICINE

## 2020-09-22 PROCEDURE — G8400 PT W/DXA NO RESULTS DOC: HCPCS | Performed by: INTERNAL MEDICINE

## 2020-09-22 PROCEDURE — 4040F PNEUMOC VAC/ADMIN/RCVD: CPT | Performed by: INTERNAL MEDICINE

## 2020-09-22 ASSESSMENT — ENCOUNTER SYMPTOMS
VOICE CHANGE: 0
SHORTNESS OF BREATH: 0
DIARRHEA: 0
COLOR CHANGE: 0
TROUBLE SWALLOWING: 0
NAUSEA: 0
EYE ITCHING: 0
SINUS PAIN: 0
BACK PAIN: 0
CHEST TIGHTNESS: 0
WHEEZING: 0
FACIAL SWELLING: 0
EYE PAIN: 0
VOMITING: 0
EYE DISCHARGE: 0
ABDOMINAL PAIN: 0
STRIDOR: 0
RECTAL PAIN: 0
CONSTIPATION: 0

## 2020-09-22 NOTE — PROGRESS NOTES
2020    TELEHEALTH EVALUATION -- Audio/Visual (During PPADU-29 public health emergency)    HPI:    Bg Caal (:  1940) has requested an audio/video evaluation for the following concern(s): Bg Caal is a 79 y.o.  female originally referred by Dr. John Arreola for opinion regarding adjuvant therapy with a diagnosis of resected stage IIA pancreatic cancer 2020 for adjuvant systemic chemotherapy considerations. She was seen on 2020 when completion of metastatic work-up was requested including imaging studies and tumor markers. She requests evaluation by phone having previously clearly expressed her opinion that she would not desire to receive adjuvant systemic chemotherapy. She did however desire to have the serological and imaging studies as baseline. Her interview today during the COVID-19 pandemic situation is performed by phone at her request to inform her of her results and further discuss potential options. Nelda Quarles is evaluated using a virtual synchronous two way real time Audio substitute for in-person clinic visit using the telephone platform during the [address] [facility] emergency pandemic crisis participating from home and I from the [facility] clinic. The patient was offered telemedicine as an option for care delivery and consented to this option.        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 John Arreola for opinion regarding adjuvant therapy with a diagnosis of resected stage IIA pancreatic cancer 2020 for adjuvant systemic chemotherapy considerations.      Dr. John Arreola had referred her to this office in 2020. Our office tried to schedule her for an appointment on 3/23/2020 but Nelda Quarles refused.   When she presented today, she admitted that she did not feel good and at that time that she was originally referred and was convinced that she did not desire to have systemic chemotherapy and did not see the utility of making an appointment. Additionally, she was frightened during the first part of the COVID-19 pandemic and therefore declined to come for evaluation or opinion. Ms. Shane Gomez presents today, 9/1/2020, referred again by Dr. Rod Javed for medical oncology opinion regarding adjuvant systemic chemotherapy and medical oncology monitoring and opinion.     Her history is as follows:      US abdomen on 11/5/2019 at [facility] documented:  · Inhomogeneous appearance of liver without focal lesion  · Intra and extrahepatic biliary ductal dilatation   · Pancreatic duct dilated up to 5 mm. These findings might be further evaluated with MRCP/ERCP as deemed clinically warranted  · Sludge in the gallbladder  · Subcentimeter right renal cyst     MRI abdomen with and without contrast on 12/19/2019 at [facility] documented:  · Intra and extrahepatic biliary ductal dilatation with dilatation of the pancreatic duct. Etiology of the duct dilatation is uncertain, no definite distal duct lesion identified on this examination. . Conventional ERCP suggested versus CT imaging, pancreas mass protocol. · Gallbladder sludge. Small left nephrogenic cyst.     Biopsy of the ampulla mass was performed on 1/27/2020. Pathology revealed moderately differentiated adenocarcinoma.     Whipple was performed on 2/25/2020 by Dr. 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    · Extends through muscularis propria into the [place] duodenal soft tissue  · Pancreas and common bile duct negative for dysplasia and malignancy  · Proximal, distal common bile duct, distal pancreas and retroperitoneal margins negative for malignancy  · 12 lymph nodes negative for malignancy     AJCC staging pT3, pN0, Mx, G2, moderately differentiated pancreaticobiliary adenocarcinoma     Physical examination at her initial visit on 9/1/2020 documents a very small but healthy appearing 98 pound woman in no acute distress. She does not have evidence of palpable supraclavicular infraclavicular axillary or inguinal lymphadenopathy. Lungs are clear heart is regular abdomen is soft and benign with a well-healed pancreatectomy scar.     CBC today (9/1/2020) reveals a WBC of 10.39. Hgb is 13.2 with an MCV of 92.6 and platelet count of 502,666.      Serology on 9/1/2020 revealed:  CMP with glucose 113, BUN 21, calcium 10.3, AST 37  CEA- 4.9  CA 19.9- 1    CT chest with contrast on 9/9/2020 documented:  · Chronic emphysematous minutes lung changes. · No evidence of mediastinal, hilar or pulmonary parenchymal mass or lymphadenopathy     CT abdomen and pelvis with contrast on 9/9/2020 documented:  · Prior Whipple procedure with pancreatic head resection. The pancreatic head-duodenal anastomosis is difficult to evaluate as no oral contrast was given to opacify the lumen of the stomach or the duodenum. No definitive recurrent or residual mass is identified in this area although evaluation is suboptimal.  · No lymphadenopathy. No evidence of metastatic disease in the liver. · Prior cholecystectomy. There is air in the biliary tree related to the prior Whipple procedure. · 9-10 mm small cyst in the left mid kidney. · Diverticulosis of the left colon. Limited bowel evaluation with no oral contrast given. The appendix is normal in caliber. · Atheromatous disease of the aortoiliac vessels. Degenerative changes of the spine.    · Prior hysterectomy    During the initial oncology interview 9/1/2020, Lorena Cisneros expressed her strong opinion that she would not desire systemic chemotherapy in the adjuvant setting. This was again discussed with her today (9/22/2020) at her follow-up phone visit where she categorically declined receiving adjuvant systemic chemotherapy.     TREATMENT SUMMARY:  · Whipple was performed on 2/25/2020 by Dr. Georgie Mayen at [address]    Review of Systems   Constitutional: Negative for appetite change, fatigue, fever and unexpected weight change. HENT: Negative for ear pain, facial swelling, hearing loss, sinus pain, trouble swallowing and voice change. Eyes: Negative for pain, discharge, itching and visual disturbance. Respiratory: Negative for chest tightness, shortness of breath, wheezing and stridor. Cardiovascular: Negative for chest pain, palpitations and leg swelling. Gastrointestinal: Negative for abdominal pain, constipation, diarrhea, nausea, rectal pain and vomiting. Endocrine: Negative for polydipsia, polyphagia and polyuria. Genitourinary: Negative for dysuria, flank pain and hematuria. Musculoskeletal: Negative for back pain, gait problem, neck pain and neck stiffness. Skin: Negative for color change, pallor, rash and wound. Allergic/Immunologic: Negative for immunocompromised state. Neurological: Negative for dizziness, speech difficulty, weakness, light-headedness and headaches. Hematological: Does not bruise/bleed easily. Psychiatric/Behavioral: Negative for confusion. The patient is not nervous/anxious. Prior to Visit Medications    Medication Sig Taking?  Authorizing Provider   Cholecalciferol (VITAMIN D3 PO) Take 1,000 Units by mouth every other day Yes Corwin Provider, MD   predniSONE (DELTASONE) 50 MG tablet 1 tablet by mouth 24, 12 and 1 hour before CT scan Yes Jeannine Gracia MD   diphenhydrAMINE (BENADRYL) 50 MG capsule 1 capsule by mouth to be taken emergency pandemic crisis participating from home and I from the [facility]. The patient was offered telemedicine as an option for care delivery and consented to this option.       CBC 9/1/2020 revealed a WBC of 10.39. Hgb is 13.2 with an MCV of 92.6 and platelet count of 229,153. Serology on 9/1/2020 revealed:  CMP with glucose 113, BUN 21, calcium 10.3, AST 37  CEA- 4.9  CA 19.9- 1    CT chest with contrast on 9/9/2020 documented:  · Chronic emphysematous minutes lung changes. · No evidence of mediastinal, hilar or pulmonary parenchymal mass or lymphadenopathy     CT abdomen and pelvis with contrast on 9/9/2020 documented:  · Prior Whipple procedure with pancreatic head resection. The pancreatic head-duodenal anastomosis is difficult to evaluate as no oral contrast was given to opacify the lumen of the stomach or the duodenum. No definitive recurrent or residual mass is identified in this area although evaluation is suboptimal.  · No lymphadenopathy. No evidence of metastatic disease in the liver. · Prior cholecystectomy. There is air in the biliary tree related to the prior Whipple procedure. · 9-10 mm small cyst in the left mid kidney. · Diverticulosis of the left colon. Limited bowel evaluation with no oral contrast given. The appendix is normal in caliber. · Atheromatous disease of the aortoiliac vessels. Degenerative changes of the spine. · Prior hysterectomy    During the initial oncology interview 9/1/2020, Tawanna Zepeda expressed her strong opinion that she would not desire systemic chemotherapy in the adjuvant setting. This was again discussed with her today (9/22/2020) at her follow-up phone visit where she categorically declined receiving adjuvant systemic chemotherapy. All of the above results were discussed with Ms. Carey Pinto in detail. All of her questions were answered to her understanding and satisfaction. She agrees to periodic monitoring but would rather do this at a distance.     I will monitor tumor markers every 2 months and see if we can get her to come back in in 6 months. If any new developments occur, she will contact us to see her sooner. 1. Pancreatic adenocarcinoma ([facility] [address]    - Comprehensive Metabolic Panel; Standing  - CEA; Standing  - Cancer Antigen 19-9; Standing      Return in about 6 months (around 3/22/2021) for follow-up w/ Dr. Fiona Pina. Piedad Perez is a 79 y.o. female being evaluated by a Virtual Visit (video visit) encounter to address concerns as mentioned above. A caregiver was present when appropriate. Due to this being a TeleHealth encounter (During LWVAK-67 public health emergency), evaluation of the following organ systems was limited: Vitals/Constitutional/EENT/Resp/CV/GI//MS/Neuro/Skin/Heme-Lymph-Imm. Pursuant to the emergency declaration under the [address] [place] [address] authority and the [employer] Act, this Virtual Visit was conducted with patient's (and/or legal guardian's) consent, to reduce the patient's risk of exposure to COVID-19 and provide necessary medical care. The patient (and/or legal guardian) has also been advised to contact this office for worsening conditions or problems, and seek emergency medical treatment and/or call 911 if deemed necessary. Patient identification was verified at the start of the visit: Yes    Total time spent on this encounter: Not billed by time    Services were provided through a video synchronous discussion virtually to substitute for in-person clinic visit. Patient and provider were located at their individual homes. --Regine Watson MD on 9/22/2020 at 3:36 PM    An electronic signature was used to authenticate this note.

## 2020-09-22 NOTE — TELEPHONE ENCOUNTER
Called and got patient registered for telephone visit with Dr Missy Borrego and she accepted the insurance disclaimer.

## 2020-11-24 ENCOUNTER — HOSPITAL ENCOUNTER (OUTPATIENT)
Dept: INFUSION THERAPY | Age: 80
Discharge: HOME | End: 2020-11-24
Payer: MEDICARE

## 2020-11-24 DIAGNOSIS — C25.9 PANCREATIC ADENOCARCINOMA (HCC): ICD-10-CM

## 2020-11-24 LAB
ALBUMIN SERPL-MCNC: 4.1 G/DL (ref 3.5–5.2)
ALP BLD-CCNC: 88 U/L (ref 35–104)
ALT SERPL-CCNC: 20 U/L (ref 9–52)
ANION GAP SERPL CALCULATED.3IONS-SCNC: 15 MMOL/L (ref 7–19)
AST SERPL-CCNC: 29 U/L (ref 14–36)
BILIRUB SERPL-MCNC: 0.3 MG/DL (ref 0.2–1.3)
BUN BLDV-MCNC: 27 MG/DL (ref 7–17)
CA 19-9: 1 U/ML (ref 0–37)
CALCIUM SERPL-MCNC: 10 MG/DL (ref 8.4–10.2)
CEA: 5.2 NG/ML (ref 0–3)
CHLORIDE BLD-SCNC: 103 MMOL/L (ref 98–111)
CO2: 28 MMOL/L (ref 22–29)
CREAT SERPL-MCNC: 0.9 MG/DL (ref 0.5–1)
GFR NON-AFRICAN AMERICAN: >60
GLOBULIN: 2.9 G/DL
GLUCOSE BLD-MCNC: 142 MG/DL (ref 74–106)
POTASSIUM SERPL-SCNC: 3.9 MMOL/L (ref 3.5–5.1)
SODIUM BLD-SCNC: 146 MMOL/L (ref 137–145)
TOTAL PROTEIN: 7 G/DL (ref 6.3–8.2)

## 2020-11-24 PROCEDURE — 86301 IMMUNOASSAY TUMOR CA 19-9: CPT

## 2020-11-24 PROCEDURE — 80053 COMPREHEN METABOLIC PANEL: CPT

## 2020-11-24 PROCEDURE — 82378 CARCINOEMBRYONIC ANTIGEN: CPT

## 2020-12-01 ENCOUNTER — TELEPHONE (OUTPATIENT)
Dept: VASCULAR SURGERY | Age: 80
End: 2020-12-01

## 2020-12-01 ENCOUNTER — VIRTUAL VISIT (OUTPATIENT)
Dept: VASCULAR SURGERY | Age: 80
End: 2020-12-01
Payer: MEDICARE

## 2020-12-01 PROCEDURE — 99442 PR PHYS/QHP TELEPHONE EVALUATION 11-20 MIN: CPT | Performed by: NURSE PRACTITIONER

## 2020-12-01 NOTE — PROGRESS NOTES
and Quinapril hcl  Past Medical History:   Diagnosis Date    Anemia     Carotid artery occlusion     Hyperlipidemia     Hypertension     Respiratory failure ([facility] [address] 3/10/2014    Substance abuse ([facility]     tobacco     Past Surgical History:   Procedure Laterality Date    CAROTID ENDARTERECTOMY  [address], 09    LCE w/Dacron patch angioplasty    HYSTERECTOMY, TOTAL ABDOMINAL      PANCREAS SURGERY      whipple    SKIN CANCER EXCISION      right arm     Family History   Problem Relation Age of Onset    Other Mother         Pneumonia,  of   [facility] Hypertension Father     Other Father          of cerebral bleed    Other Sister          of pneumonia     Social History     Tobacco Use    Smoking status: Current Every Day Smoker     Packs/day: 0.50     Years: 47.00     Pack years: 23.50    Smokeless tobacco: Never Used    Tobacco comment: now smoking 5-7 cigs per day x 1 year   Substance Use Topics    Alcohol use: No         Review of Systems    Constitutional - no significant activity change, appetite change, or unexpected weight change. No fever or chills. No diaphoresis or significant fatigue. HENT - no significant rhinorrhea or epistaxis. No tinnitus or significant hearing loss. Eyes - no sudden vision change or amaurosis. Respiratory - no significant shortness of breath, wheezing, or stridor. No apnea, cough, or chest tightness associated with shortness of breath. Cardiovascular - no chest pain, has had one episode syncope and she went to hospital and was thought to due to hypoglycmeia, no significant dizziness. No palpitations or significant leg swelling.  has not had claudication. Gastrointestinal - has not had abdominal swelling or pain. No blood in stool. No severe constipation, diarrhea, nausea, or vomiting. Genitourinary - No difficulty urinating, dysuria, frequency, or urgency. No flank pain or hematuria.   Musculoskeletal - has not had back pain, gait disturbance, or myalgia. Skin - no color change, rash, pallor, has not had new wound. Neurologic - no dizziness, facial asymmetry, or light headedness. No seizures. No speech difficulty or lateralizing weakness. Hematologic - no easy bruising or excessive bleeding. Psychiatric - no severe anxiety or nervousness. No confusion. All other review of systems are negative. PHYSICAL EXAMINATION:    [ INSTRUCTIONS:  \"[x]\" Indicates a positive item  \"[]\" Indicates a negative item  -- DELETE ALL ITEMS NOT EXAMINED]    [x] Alert  [x] Oriented to person/place/time    [x] No apparent distress  [] Toxic appearing  [x] Normal Mood  [] Anxious appearing    [] Depressed appearing  [] Confused appearing      [] Poor short term memory  [] Poor long term memory   Memory appears to be intact            Assessment    1. Bilateral carotid artery stenosis          Plan    Needs cvls  Patient instructed to call or proceed to the emergency room with any symptoms of lateralizing weakness, loss of vision in one eye, or episodes slurred speech. Strongly encouraged start/continue statin therapy  Recommended no smoking    Documentation:  I communicated with the patient and/or health care decision maker about cvd. Details of this discussion including any medical advice provided: as above      I affirm this is a Patient Initiated Episode with an Established Patient who has not had a related appointment within my department in the past 7 days or scheduled within the next 24 hours.     Total Time: minutes: 11-20 minutes    Note: not billable if this call serves to triage the patient into an appointment for the relevant concern      [facility]

## 2020-12-01 NOTE — TELEPHONE ENCOUNTER
Spoke with patient to let her know we have her scheduled for carotid US on 12/3/2020 at [facility] [address]. We will call with results.

## 2020-12-03 ENCOUNTER — HOSPITAL ENCOUNTER (OUTPATIENT)
Dept: VASCULAR LAB | Age: 80
Discharge: HOME | End: 2020-12-03
Payer: MEDICARE

## 2020-12-03 PROCEDURE — 93880 EXTRACRANIAL BILAT STUDY: CPT

## 2020-12-07 ENCOUNTER — TELEPHONE (OUTPATIENT)
Dept: VASCULAR SURGERY | Age: 80
End: 2020-12-07

## 2021-01-26 ENCOUNTER — HOSPITAL ENCOUNTER (OUTPATIENT)
Dept: INFUSION THERAPY | Age: 81
Discharge: HOME | End: 2021-01-26
Payer: MEDICARE

## 2021-01-26 DIAGNOSIS — C25.9 PANCREATIC ADENOCARCINOMA (HCC): ICD-10-CM

## 2021-03-22 NOTE — PROGRESS NOTES
Patient:  Rod Luna  YOB: 1940  Date of Service: 3/23/2021  MRN: [id number]    Primary Care Physician: JAMIR Romano    Chief Complaint   Patient presents with    Follow-up     Pancreatic adenocarcinoma [facility])       Patient Seen, Chart, Consults notes, Labs, Radiology studies reviewed. Subjective: Charu Mckeon is a 79 y.o.  female  managed with primary and secondary diagnoses as outlined:   · Originally referred by Dr. Jose Ambrosio opinion regarding adjuvant chemotherapy considerations for a diagnosis of resected stage IIA pancreatic cancer 2/25/2020  · Mild upward trending of CEA level. Allan Munson repeatedly and categorically declined receiving adjuvant systemic chemotherapy at her initial oncology consultation on 9/1/2020 and in follow-up 9/22/2020. She did agree however to monitoring and follow-up. Imaging studies with CT scans of the chest abdomen pelvis did not show evidence of recurrent or metastatic disease. 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 Jose Ambrosio opinion regarding adjuvant therapy with a diagnosis of resected stage IIA pancreatic cancer 2/25/2020 for adjuvant systemic chemotherapy considerations.      Dr. Karthik Wharton referred her to this office in March 2020.  Our office tried to schedule her for an appointment on 3/23/2020 but Sandra refused.  When she presented today, she admitted that she did not feel good and at that time that she was originally referred and was convinced that she did not desire to have systemic chemotherapy and did not see the utility of making an appointment.  Additionally, she was frightened during the first part of the COVID-19 pandemic and therefore declined to come for evaluation or opinion.   Ms. Dale Fajardo presents today, 9/1/2020, referred again by  [facility] medical oncology opinion regarding adjuvant systemic chemotherapy and medical oncology monitoring and opinion.     Her history is as follows:      US abdomen on 11/5/2019 at [facility] documented:  · Inhomogeneous appearance of liver without focal lesion  · Intra and extrahepatic biliary ductal dilatation   · Pancreatic duct dilated up to 5 mm. These findings might be further evaluated with MRCP/ERCP as deemed clinically warranted  · Sludge in the gallbladder  · Subcentimeter right renal cyst     MRI abdomen with and without contrast on 12/19/2019 at [facility] documented:  · Intra and extrahepatic biliary ductal dilatation with dilatation of the pancreatic duct. Etiology of the duct dilatation is uncertain, no definite distal duct lesion identified on this examination. . Conventional ERCP suggested versus CT imaging, pancreas mass protocol. · Gallbladder sludge. Small left nephrogenic cyst.     Biopsy of the ampulla mass was performed on 1/27/2020.   Pathology revealed moderately differentiated adenocarcinoma.     Whipple was performed on 2/25/2020 by Dr. Scooter Cummings at [address] [place] [address] St:  Gallbladder, cholecystectomy, mild chronic cholecystitis, no gallstones identified:  · Negative for dysplasia and malignancy   Pancreas with common bile duct, duodenum and distal stomach, pancreaticoduodenectomy:  · 1.9 cm, grade 2 (G2), moderately differentiated adenocarcinoma, pancreaticobiliary type arising in the ampulla and periampullary duodenum.   · Extends through muscularis propria into the clayton duodenal soft tissue  · Pancreas and common bile duct negative for dysplasia and malignancy  · Proximal, distal common bile duct, distal pancreas and retroperitoneal margins negative for malignancy  · 12 lymph nodes negative for malignancy     AJCC staging pT3, pN0, Mx, G2, moderately differentiated pancreaticobiliary performed on 2020 by Dr. Joe Claros at [address]       Allergies:  Daypro [oxaprozin], Iodides, and Quinapril hcl    Medicines:  Current Outpatient Medications   Medication Sig Dispense Refill    Multiple Vitamins-Minerals (CENTRUM ADULTS PO) Take by mouth daily      Cholecalciferol (VITAMIN D3 PO) Take 1,000 Units by mouth every other day      diphenhydrAMINE (BENADRYL) 50 MG capsule 1 capsule by mouth to be taken with last Prednisone dose 1 hour before procedure 1 capsule 0    famotidine (PEPCID) 10 MG tablet Take 10 mg by mouth 2 times daily      olmesartan-hydrochlorothiazide (BENICAR HCT) 40-25 MG per tablet Take 1 tablet by mouth daily      levothyroxine (SYNTHROID) 25 MCG tablet Take 50 mcg by mouth Daily       aspirin 81 MG EC tablet Take 81 mg by mouth daily.  rosuvastatin (CRESTOR) 10 MG tablet Take 10 mg by mouth daily.  predniSONE (DELTASONE) 50 MG tablet 1 tablet by mouth 24, 12 and 1 hour before CT scan (Patient not taking: Reported on 3/23/2021) 3 tablet 0     No current facility-administered medications for this visit.         Past Medical History:      Diagnosis Date    Anemia     Carotid artery occlusion     Elevated CEA 3/23/2021    Hyperlipidemia     Hypertension     Respiratory failure ([facility] [address] 3/10/2014    Substance abuse ([facility] [address]     tobacco        Past Surgical History:      Procedure Laterality Date    CAROTID ENDARTERECTOMY  [facility], 09    LCE w/Dacron patch angioplasty    HYSTERECTOMY, TOTAL ABDOMINAL      PANCREAS SURGERY      whipple    SKIN CANCER EXCISION      right arm        Family History:      Problem Relation Age of Onset    Other Mother         Pneumonia,  of   [facility] Hypertension Father     Other Father          of cerebral bleed    Other Sister          of pneumonia        Social History  Social History     Tobacco Use    Smoking status: Current Every Day Smoker     Packs/day: 0.50     Years: 47.00     Pack years: 23.50  Smokeless tobacco: Never Used    Tobacco comment: now smoking 5-7 cigs per day x 1 year   Substance Use Topics    Alcohol use: No    Drug use: No          Review of Systems:  Constitutional: Negative for chills, fatigue, fever or significant weight loss. HENT: Negative for congestion, hearing loss, nosebleeds or sore throat. Eyes: Negative for photophobia, pain, discharge, redness and visual disturbance. Respiratory: Negative for cough, shortness of breath, or wheezing. Cardiovascular: Negative for chest pain, palpitations or leg swelling. Gastrointestinal: Negative for abdominal pain, blood in stool, constipation, diarrhea, nausea or vomiting. Genitourinary: Negative for dysuria, flank pain, frequency, hematuria or urgency. Musculoskeletal: Negative for back pain, joint swelling, myalgias or neck pain. Skin: Negative for rash or petechiae. Neurological: Negative for tremors, seizures, syncope, weakness or headaches. Hematological: No active bruising or bleeding. Psychiatric/Behavioral: Negative for hallucinations. Objective:  Vital Signs: Blood pressure (!) 142/80, pulse 88, temperature 96.8 °F (36 °C), height 5' 0.5\" (1.537 m), weight 115 lb 12.8 oz (52.5 kg), SpO2 96 %. Physical Exam   Constitutional: Oriented to person, place, and time. No acute distress. Head: Normocephalic and atraumatic. Nose: Nose normal.   Mouth/Throat: Oropharynx is clear and moist. No oropharyngeal exudate. Eyes: Pupils are equal and round. Conjunctivae and EOM are normal. No scleral icterus. Neck: Normal range of motion. Neck supple. No JVD. No appreciable thyromegaly. Cardiovascular: Normal rate, regular rhythm, normal heart sounds and intact distal pulses. Exam reveals no gallop, murmurs or friction rub. Pulmonary/Chest: Effort normal and breath sounds normal. No respiratory distress. No wheezes. Abdominal: Soft. Bowel sounds are normal. No organomegally or masses.  No tenderness. There is no rebound and no guarding. Musculoskeletal: Normal range of motion. No edema or tenderness. Lymphadenopathy: No cervical, axillary or inguinal lymphadenopathy. Neurological: Alert and oriented to person, place, and time. Cranial nerves are intact. Neurological exam is nonfocal  Skin: Skin is warm and dry. No rash noted. No erythema. No pallor. Psychiatric: Judgment normal.          Labs:  BMP: No results for input(s): NA, K, CL, CO2, PHOS, BUN, CREATININE, CALCIUM in the last 72 hours. CBC:   Recent Labs     03/23/21  1345   WBC 8.75   HGB 11.5   HCT 34.0*   MCV 88.8   *     PT/INR: No results for input(s): PROTIME, INR in the last 72 hours. APTT: No results for input(s): APTT in the last 72 hours. Magnesium:No results for input(s): MG in the last 72 hours. Phosphorus:No results for input(s): PHOS in the last 72 hours. Hepatic: No results for input(s): ALKPHOS, ALT, AST, PROT, BILITOT, BILIDIR, LABALBU in the last 72 hours. Cultures:   No results for input(s): CULTURE in the last 72 hours. Radiology reports as per the Radiologist  Radiology: No results found. ASSESSMENT AND PLAN:  Ayaan Mckeon is a 79 y.o.  female  managed with primary and secondary diagnoses as outlined:   · Originally referred by Dr. Gregoria Lobo opinion regarding adjuvant chemotherapy considerations for a diagnosis of resected stage IIA pancreatic cancer 2/25/2020  · Mild upward trending of CEA level. Kelli Haider repeatedly and categorically declined receiving adjuvant systemic chemotherapy at her initial oncology consultation on 9/1/2020 and in follow-up 9/22/2020. She did agree however to monitoring and follow-up. Imaging studies with CT scans of the chest abdomen pelvis did not show evidence of recurrent or metastatic disease. Kelli Haider presents today accompanied by her  Josafat. CBC today  3/23/2021 reveals a WBC of  8.75 .  Hgb is  11.5 with an MCV of 88.8 and platelet count of 410,000 .       Physical examination today, 3/23/2021, is without abnormal findings, specifically no supraclavicular infraclavicular cervical axillary or inguinal lymphadenopathy. Lungs are clear heart is regular abdomen is soft and benign. PLAN:  · Smoking cessation is strongly encouraged and discussed at length today. Her  of course is very in favor of her stopping this habit. This could be contributing to the elevated CEA  · Consult GI for EGD and colonoscopy due to increasing CEA  · Repeat CMP and tumor markers today  · Follow-up appointment in 3 months. #2  Increasing CEA level, Martita Slaughter is a smoker     Serology on 9/1/2020 revealed:  CMP with glucose 113, BUN 21, calcium 10.3, AST 37  CEA- 4.9  CA 19.9- 1    Serology on 11/24/2020 revealed:  CEA - 5.2  Ca 19.9 - 1    Serology on 1/26/2021 revealed:  CEA - 5.6  Ca 19.9 - <2    Repeat serology today  Smoking cessation undertaken today as noted above    #3  Tumor screening and health maintenance    Bone Health -DEXA scan this year at [facility] = osteoporosis. Considering Prolia injections as recommended by  PCP, Rosibel Him APRN. (records requested)    Breast cancer screening-Mammogram this year at [facility] per patient. Scheduled for repeat in July 2021 to monitor calcifications in right breast.  Records requested. GI cancer screening- Colorguard ordered per PCP    GYN cancer screening -History of total abd hysterectomy. 1. Pancreatic adenocarcinoma ([facility] [address]     - Comprehensive Metabolic Panel; Standing  - CEA; Standing  - Cancer Antigen 19-9; Standing        Return in about 6 months (around 3/22/2021) for follow-up w/ Dr. Crista Martínez am scribing for Jin Fields MD. Electronically signed by Thiago Joseph RN on 3/23/2021 at 2:28 PM        Martita Slaughter was seen today for follow-up. Diagnoses and all orders for this visit:    Pancreatic adenocarcinoma ([facility] [address]  -     CEA;  Future  - Cancer Antigen 19-9; Future  -     Comprehensive Metabolic Panel; Future  -     Taran Quevedo MD, Gastroenterology, [facility] [place] Auguste MD, Gastroenterology, [place]    Elevated CEA  -     Taran Quevedo MD, Gastroenterology, [place]    Age-related osteoporosis without current pathological fracture    Encounter for screening colonoscopy  -     Taran Quevedo MD, [facility]        Orders Placed This Encounter   Procedures    CEA     Standing Status:   Future     Number of Occurrences:   1     Standing Expiration Date:   3/23/2022    Cancer Antigen 19-9     Standing Status:   Future     Number of Occurrences:   1     Standing Expiration Date:   3/23/2022    Comprehensive Metabolic Panel     Standing Status:   Future     Number of Occurrences:   1     Standing Expiration Date:   3/23/2022   Taran Quevedo MD, Gastroenterology, [place]     Referral Priority:   Routine     Referral Type:   Eval and Treat     Referral Reason:   Specialty Services Required     Referred to Provider:   Jamal Abraham MD     Requested Specialty:   Gastroenterology     Number of Visits Requested:   1       No orders of the defined types were placed in this encounter. Return in about 3 months (around 6/23/2021). I, Dr. Reji Hu, personally performed the services described in this documentation as scribed by Radha Carlos in my presence, and it is both accurate and complete.

## 2021-03-23 ENCOUNTER — HOSPITAL ENCOUNTER (OUTPATIENT)
Dept: INFUSION THERAPY | Age: 81
Discharge: HOME | End: 2021-03-23
Payer: MEDICARE

## 2021-03-23 ENCOUNTER — OFFICE VISIT (OUTPATIENT)
Dept: HEMATOLOGY | Age: 81
End: 2021-03-23
Payer: MEDICARE

## 2021-03-23 VITALS
OXYGEN SATURATION: 96 % | TEMPERATURE: 96.8 F | WEIGHT: 115.8 LBS | HEART RATE: 88 BPM | HEIGHT: 61 IN | BODY MASS INDEX: 21.86 KG/M2 | SYSTOLIC BLOOD PRESSURE: 142 MMHG | DIASTOLIC BLOOD PRESSURE: 80 MMHG

## 2021-03-23 DIAGNOSIS — C25.9 PANCREATIC ADENOCARCINOMA (HCC): ICD-10-CM

## 2021-03-23 DIAGNOSIS — M81.0 AGE-RELATED OSTEOPOROSIS WITHOUT CURRENT PATHOLOGICAL FRACTURE: ICD-10-CM

## 2021-03-23 DIAGNOSIS — Z12.11 ENCOUNTER FOR SCREENING COLONOSCOPY: ICD-10-CM

## 2021-03-23 DIAGNOSIS — Z00.00 HEALTH CARE MAINTENANCE: ICD-10-CM

## 2021-03-23 DIAGNOSIS — C25.9 PANCREATIC ADENOCARCINOMA (HCC): Primary | ICD-10-CM

## 2021-03-23 DIAGNOSIS — C80.1 CANCER (HCC): ICD-10-CM

## 2021-03-23 DIAGNOSIS — R97.0 ELEVATED CEA: ICD-10-CM

## 2021-03-23 LAB
ALBUMIN SERPL-MCNC: 4.2 G/DL (ref 3.5–5.2)
ALP BLD-CCNC: 74 U/L (ref 35–104)
ALT SERPL-CCNC: 21 U/L (ref 9–52)
ANION GAP SERPL CALCULATED.3IONS-SCNC: 12 MMOL/L (ref 7–19)
AST SERPL-CCNC: 30 U/L (ref 14–36)
BASOPHILS ABSOLUTE: 0.04 K/UL (ref 0.01–0.08)
BASOPHILS RELATIVE PERCENT: 0.5 % (ref 0.1–1.2)
BILIRUB SERPL-MCNC: <0.2 MG/DL (ref 0.2–1.3)
BUN BLDV-MCNC: 26 MG/DL (ref 7–17)
CA 19-9: 1 U/ML (ref 0–37)
CALCIUM SERPL-MCNC: 9.7 MG/DL (ref 8.4–10.2)
CEA: 5.3 NG/ML (ref 0–3)
CHLORIDE BLD-SCNC: 104 MMOL/L (ref 98–111)
CO2: 30 MMOL/L (ref 22–29)
CREAT SERPL-MCNC: 1.1 MG/DL (ref 0.5–1)
EOSINOPHILS ABSOLUTE: 0.03 K/UL (ref 0.04–0.54)
EOSINOPHILS RELATIVE PERCENT: 0.3 % (ref 0.7–7)
GFR NON-AFRICAN AMERICAN: 48
GLOBULIN: 2.9 G/DL
GLUCOSE BLD-MCNC: 150 MG/DL (ref 74–106)
HCT VFR BLD CALC: 34 % (ref 34.1–44.9)
HEMOGLOBIN: 11.5 G/DL (ref 11.2–15.7)
LYMPHOCYTES ABSOLUTE: 2.37 K/UL (ref 1.18–3.74)
LYMPHOCYTES RELATIVE PERCENT: 27.1 % (ref 19.3–53.1)
MCH RBC QN AUTO: 30 PG (ref 25.6–32.2)
MCHC RBC AUTO-ENTMCNC: 33.8 G/DL (ref 32.3–35.5)
MCV RBC AUTO: 88.8 FL (ref 79.4–94.8)
MONOCYTES ABSOLUTE: 0.69 K/UL (ref 0.24–0.82)
MONOCYTES RELATIVE PERCENT: 7.9 % (ref 4.7–12.5)
NEUTROPHILS ABSOLUTE: 5.62 K/UL (ref 1.56–6.13)
NEUTROPHILS RELATIVE PERCENT: 64.2 % (ref 34–71.1)
PDW BLD-RTO: 15.5 % (ref 11.7–14.4)
PLATELET # BLD: 410 K/UL (ref 182–369)
PMV BLD AUTO: 8.4 FL (ref 7.4–10.4)
POTASSIUM SERPL-SCNC: 3.9 MMOL/L (ref 3.5–5.1)
RBC # BLD: 3.83 M/UL (ref 3.93–5.22)
SODIUM BLD-SCNC: 146 MMOL/L (ref 137–145)
TOTAL PROTEIN: 7 G/DL (ref 6.3–8.2)
WBC # BLD: 8.75 K/UL (ref 3.98–10.04)

## 2021-03-23 PROCEDURE — 85025 COMPLETE CBC W/AUTO DIFF WBC: CPT

## 2021-03-23 PROCEDURE — 80053 COMPREHEN METABOLIC PANEL: CPT

## 2021-03-23 PROCEDURE — G8427 DOCREV CUR MEDS BY ELIG CLIN: HCPCS | Performed by: INTERNAL MEDICINE

## 2021-03-23 PROCEDURE — 1090F PRES/ABSN URINE INCON ASSESS: CPT | Performed by: INTERNAL MEDICINE

## 2021-03-23 PROCEDURE — 1123F ACP DISCUSS/DSCN MKR DOCD: CPT | Performed by: INTERNAL MEDICINE

## 2021-03-23 PROCEDURE — 82378 CARCINOEMBRYONIC ANTIGEN: CPT

## 2021-03-23 PROCEDURE — G8400 PT W/DXA NO RESULTS DOC: HCPCS | Performed by: INTERNAL MEDICINE

## 2021-03-23 PROCEDURE — G8484 FLU IMMUNIZE NO ADMIN: HCPCS | Performed by: INTERNAL MEDICINE

## 2021-03-23 PROCEDURE — 99212 OFFICE O/P EST SF 10 MIN: CPT

## 2021-03-23 PROCEDURE — 4004F PT TOBACCO SCREEN RCVD TLK: CPT | Performed by: INTERNAL MEDICINE

## 2021-03-23 PROCEDURE — G8420 CALC BMI NORM PARAMETERS: HCPCS | Performed by: INTERNAL MEDICINE

## 2021-03-23 PROCEDURE — 4040F PNEUMOC VAC/ADMIN/RCVD: CPT | Performed by: INTERNAL MEDICINE

## 2021-03-23 PROCEDURE — 86301 IMMUNOASSAY TUMOR CA 19-9: CPT

## 2021-03-23 PROCEDURE — 99214 OFFICE O/P EST MOD 30 MIN: CPT | Performed by: INTERNAL MEDICINE

## 2021-03-23 PROCEDURE — 36415 COLL VENOUS BLD VENIPUNCTURE: CPT

## 2021-06-08 ENCOUNTER — OFFICE VISIT (OUTPATIENT)
Dept: GASTROENTEROLOGY | Age: 81
End: 2021-06-08
Payer: MEDICARE

## 2021-06-08 VITALS
WEIGHT: 114.6 LBS | OXYGEN SATURATION: 94 % | BODY MASS INDEX: 22.5 KG/M2 | HEIGHT: 60 IN | SYSTOLIC BLOOD PRESSURE: 130 MMHG | HEART RATE: 85 BPM | DIASTOLIC BLOOD PRESSURE: 78 MMHG

## 2021-06-08 DIAGNOSIS — R97.0 ELEVATED CEA: ICD-10-CM

## 2021-06-08 DIAGNOSIS — C25.9 PANCREATIC ADENOCARCINOMA (HCC): Primary | ICD-10-CM

## 2021-06-08 PROCEDURE — 4004F PT TOBACCO SCREEN RCVD TLK: CPT | Performed by: INTERNAL MEDICINE

## 2021-06-08 PROCEDURE — G8427 DOCREV CUR MEDS BY ELIG CLIN: HCPCS | Performed by: INTERNAL MEDICINE

## 2021-06-08 PROCEDURE — G8420 CALC BMI NORM PARAMETERS: HCPCS | Performed by: INTERNAL MEDICINE

## 2021-06-08 PROCEDURE — 4040F PNEUMOC VAC/ADMIN/RCVD: CPT | Performed by: INTERNAL MEDICINE

## 2021-06-08 PROCEDURE — 1090F PRES/ABSN URINE INCON ASSESS: CPT | Performed by: INTERNAL MEDICINE

## 2021-06-08 PROCEDURE — 99204 OFFICE O/P NEW MOD 45 MIN: CPT | Performed by: INTERNAL MEDICINE

## 2021-06-08 PROCEDURE — 1123F ACP DISCUSS/DSCN MKR DOCD: CPT | Performed by: INTERNAL MEDICINE

## 2021-06-08 PROCEDURE — G8400 PT W/DXA NO RESULTS DOC: HCPCS | Performed by: INTERNAL MEDICINE

## 2021-06-08 RX ORDER — NITROFURANTOIN 25; 75 MG/1; MG/1
1 CAPSULE ORAL 2 TIMES DAILY
COMMUNITY
Start: 2021-05-26

## 2021-06-08 NOTE — PROGRESS NOTES
'    Rufino   Primary Care Provider JAMIR Munoz  Referral Source - Alex Nicole is a [de-identified] y.o. Chief Complaint  Chief Complaint   Patient presents with    Follow-up     elevated CEA         ASSESSMENT/PLAN:  1. Pancreatic adenocarcinoma [facility]) s/p Whipple with Dr Ximena Whitten in 2020 - patient has declined adjuvant therapy. Followed conservatively with Dr Say Zelaya    2. Elevated CEA - elevated but stable. I had a long discussion with the patient regarding indications, need and risk of upper and lower endoscopy. We discussed the need for a bowel prep as well as diet and medication changes. My recommendation be for an upper endoscopy and colonoscopy to help rule out either a GI malignancy or even a premalignant polyp or other lesion in her GI tract. She is agreeable continue    -Schedule EGD and Colonoscopy   - Okay for [facility]Hospital either one. I have discussed the benefits, alternatives, and risks (including bleeding, perforation and death)  for pursuing Endoscopy (EGD/Colonscopy/EUS/ERCP) with the patient and they are willing to continue. We also discussed the need for anesthesia, IV access, proper dietary changes, medication changes if necessary, and need for bowel prep (if ordered) prior to their Endoscopic procedure. They are aware they must have someone accompany them to their scheduled procedure to drive them home - they agree to the above and are willing to continue. HPI    [de-identified]-year-old female referred Dr. Say Zelaya for an elevated CEA. She has a history of resected stage IIa pancreatic adenocarcinoma    Cancer TREATMENT SUMMARY:  · Whipple was performed on 2/25/2020 by Dr. Nalini Maldonado at [address]    She has denied systemic adjuvant therapy specifically chemotherapy and is being followed closely by Dr. Say Zelaya. She is noted to have an elevated CEA atq > 5. An elevated for at least 6 months.   Notably her other tumor markers are appearance. She is well-developed. Comments: /78 (Site: Left Upper Arm, Position: Sitting, Cuff Size: Medium Adult)   Pulse 85   Ht 5' (1.524 m)   Wt 114 lb 9.6 oz (52 kg)   SpO2 94%   BMI 22.38 kg/m²    Cardiovascular:      Rate and Rhythm: Normal rate and regular rhythm. Heart sounds: No murmur heard. Pulmonary:      Effort: Pulmonary effort is normal. No respiratory distress. Breath sounds: Normal breath sounds. Abdominal:      General: Bowel sounds are normal. There is no distension. Palpations: Abdomen is soft. There is no mass. Tenderness: There is no abdominal tenderness. There is no guarding or rebound. Skin:     General: Skin is warm and dry. Coloration: Skin is not pale. Neurological:      Mental Status: She is alert and oriented to person, place, and time. Labs:  No visits with results within 1 Month(s) from this visit.    Latest known visit with results is:   Hospital Outpatient Visit on 03/23/2021   Component Date Value Ref Range Status    WBC 03/23/2021 8.75  3.98 - 10.04 K/uL Final    RBC 03/23/2021 3.83* 3.93 - 5.22 M/uL Final    Hemoglobin 03/23/2021 11.5  11.2 - 15.7 g/dL Final    Hematocrit 03/23/2021 34.0* 34.1 - 44.9 % Final    MCV 03/23/2021 88.8  79.4 - 94.8 fL Final    MCH 03/23/2021 30.0  25.6 - 32.2 pg Final    MCHC 03/23/2021 33.8  32.3 - 35.5 g/dL Final    RDW 03/23/2021 15.5* 11.7 - 14.4 % Final    Platelets 73/01/2059 410* 182 - 369 K/uL Final    MPV 03/23/2021 8.4  7.4 - 10.4 fL Final    Neutrophils % 03/23/2021 64.2  34.0 - 71.1 % Final    Lymphocytes % 03/23/2021 27.1  19.3 - 53.1 % Final    Monocytes % 03/23/2021 7.9  4.7 - 12.5 % Final    Eosinophils % 03/23/2021 0.3* 0.7 - 7.0 % Final    Basophils % 03/23/2021 0.5  0.1 - 1.2 % Final    Neutrophils Absolute 03/23/2021 5.62  1.56 - 6.13 K/uL Final    Lymphocytes Absolute 03/23/2021 2.37  1.18 - 3.74 K/uL Final    Monocytes Absolute 03/23/2021 0.69  0.24 - 0.82 K/uL Final    Eosinophils Absolute 03/23/2021 0.03* 0.04 - 0.54 K/uL Final    Basophils Absolute 03/23/2021 0.04  0.01 - 0.08 K/uL Final    Sodium 03/23/2021 146* 137 - 145 mmol/L Final    Potassium 03/23/2021 3.9  3.5 - 5.1 mmol/L Final    Chloride 03/23/2021 104  98 - 111 mmol/L Final    CO2 03/23/2021 30* 22 - 29 mmol/L Final    Anion Gap 03/23/2021 12  7 - 19 mmol/L Final    Glucose 03/23/2021 150* 74 - 106 mg/dL Final    BUN 03/23/2021 26* 7 - 17 mg/dL Final    CREATININE 03/23/2021 1.1* 0.5 - 1.0 mg/dL Final    GFR Non- 03/23/2021 48* >60 Final    Calcium 03/23/2021 9.7  8.4 - 10.2 mg/dL Final    Total Protein 03/23/2021 7.0  6.3 - 8.2 g/dL Final    Albumin 03/23/2021 4.2  3.5 - 5.2 g/dL Final    Total Bilirubin 03/23/2021 <0.2  0.2 - 1.3 mg/dL Final    Alkaline Phosphatase 03/23/2021 74  35 - 104 U/L Final    ALT 03/23/2021 21  9 - 52 U/L Final    AST 03/23/2021 30  14 - 36 U/L Final    Globulin 03/23/2021 2.9  g/dL Final    CA 19-9 03/23/2021 1  0 - 37 U/mL Final    CEA 03/23/2021 5.3* 0.0 - 3.0 ng/mL Final     In total over 45 minutes were spent in chart review, discussion with patient, counseling regarding the procedures risk and indications, reviewing and compiling other records regarding her pancreatic cancer history as well as documentation.

## 2021-06-30 ENCOUNTER — TELEPHONE (OUTPATIENT)
Dept: GASTROENTEROLOGY | Age: 81
End: 2021-06-30

## 2021-06-30 NOTE — TELEPHONE ENCOUNTER
Patient sched for EGD/CLN on 7/1/21 for elevated CEA (5.2 on 3/23). Patient called on 6/30/21 stating that she just could not complete the prep for the procedure and then be up all night going to the bathroom. WE discussed some changes she could make to make the prep a little easier, but just did not want to do. She decided to come in for the EGD and to cancel the CLN.   I adjusted the surgical schedule

## 2021-07-01 ENCOUNTER — ANESTHESIA (OUTPATIENT)
Dept: OPERATING ROOM | Age: 81
End: 2021-07-01

## 2021-07-01 ENCOUNTER — HOSPITAL ENCOUNTER (OUTPATIENT)
Age: 81
Setting detail: OUTPATIENT SURGERY
Discharge: HOME | End: 2021-07-01
Attending: INTERNAL MEDICINE | Admitting: INTERNAL MEDICINE
Payer: MEDICARE

## 2021-07-01 ENCOUNTER — ANESTHESIA EVENT (OUTPATIENT)
Dept: OPERATING ROOM | Age: 81
End: 2021-07-01

## 2021-07-01 ENCOUNTER — APPOINTMENT (OUTPATIENT)
Dept: OPERATING ROOM | Age: 81
End: 2021-07-01

## 2021-07-01 ENCOUNTER — TELEPHONE (OUTPATIENT)
Dept: GASTROENTEROLOGY | Age: 81
End: 2021-07-01

## 2021-07-01 VITALS
BODY MASS INDEX: 22.38 KG/M2 | HEIGHT: 60 IN | DIASTOLIC BLOOD PRESSURE: 43 MMHG | OXYGEN SATURATION: 99 % | HEART RATE: 72 BPM | RESPIRATION RATE: 16 BRPM | WEIGHT: 114 LBS | SYSTOLIC BLOOD PRESSURE: 128 MMHG | TEMPERATURE: 98.1 F

## 2021-07-01 VITALS — DIASTOLIC BLOOD PRESSURE: 71 MMHG | SYSTOLIC BLOOD PRESSURE: 152 MMHG | OXYGEN SATURATION: 92 %

## 2021-07-01 PROCEDURE — 43235 EGD DIAGNOSTIC BRUSH WASH: CPT

## 2021-07-01 PROCEDURE — G8907 PT DOC NO EVENTS ON DISCHARG: HCPCS

## 2021-07-01 PROCEDURE — 43235 EGD DIAGNOSTIC BRUSH WASH: CPT | Performed by: INTERNAL MEDICINE

## 2021-07-01 PROCEDURE — G8918 PT W/O PREOP ORDER IV AB PRO: HCPCS

## 2021-07-01 RX ORDER — ONDANSETRON 2 MG/ML
4 INJECTION INTRAMUSCULAR; INTRAVENOUS
Status: DISCONTINUED | OUTPATIENT
Start: 2021-07-01 | End: 2021-07-01 | Stop reason: HOSPADM

## 2021-07-01 RX ORDER — PROPOFOL 10 MG/ML
INJECTION, EMULSION INTRAVENOUS PRN
Status: DISCONTINUED | OUTPATIENT
Start: 2021-07-01 | End: 2021-07-01 | Stop reason: SDUPTHER

## 2021-07-01 RX ORDER — LIDOCAINE HYDROCHLORIDE 10 MG/ML
INJECTION, SOLUTION INFILTRATION; PERINEURAL PRN
Status: DISCONTINUED | OUTPATIENT
Start: 2021-07-01 | End: 2021-07-01 | Stop reason: SDUPTHER

## 2021-07-01 RX ORDER — DIPHENHYDRAMINE HYDROCHLORIDE 50 MG/ML
12.5 INJECTION INTRAMUSCULAR; INTRAVENOUS
Status: DISCONTINUED | OUTPATIENT
Start: 2021-07-01 | End: 2021-07-01 | Stop reason: HOSPADM

## 2021-07-01 RX ORDER — PROMETHAZINE HYDROCHLORIDE 25 MG/ML
6.25 INJECTION, SOLUTION INTRAMUSCULAR; INTRAVENOUS
Status: DISCONTINUED | OUTPATIENT
Start: 2021-07-01 | End: 2021-07-01 | Stop reason: HOSPADM

## 2021-07-01 RX ORDER — SODIUM CHLORIDE 9 MG/ML
INJECTION, SOLUTION INTRAVENOUS CONTINUOUS
Status: DISCONTINUED | OUTPATIENT
Start: 2021-07-01 | End: 2021-07-01 | Stop reason: HOSPADM

## 2021-07-01 RX ADMIN — LIDOCAINE HYDROCHLORIDE 50 MG: 10 INJECTION, SOLUTION INFILTRATION; PERINEURAL at 11:48

## 2021-07-01 RX ADMIN — SODIUM CHLORIDE: 9 INJECTION, SOLUTION INTRAVENOUS at 10:43

## 2021-07-01 RX ADMIN — PROPOFOL 130 MG: 10 INJECTION, EMULSION INTRAVENOUS at 11:48

## 2021-07-01 NOTE — ANESTHESIA POSTPROCEDURE EVALUATION
Department of Anesthesiology  Postprocedure Note    Patient: Frederick Guzman  MRN: [id number]  [place]: 1940  Date of evaluation: 7/1/2021  Time:  11:56 AM     Procedure Summary     Date: 07/01/21 Room / Location: [facility] ENDO 02 / [address]    Anesthesia Start: 9159 Anesthesia Stop: 8444    Procedure: EGD DIAGNOSTIC ONLY (N/A Abdomen) Diagnosis: (ELEVATED CEA)    Surgeons: Les Zheng MD Responsible Provider: JAMIR Avitia CRNA    Anesthesia Type: general, TIVA ASA Status: 3          Anesthesia Type: general, TIVA    Nelson Phase I:      Nelson Phase II:      Last vitals: Reviewed and per EMR flowsheets.        Anesthesia Post Evaluation    Patient location during evaluation: PACU  Patient participation: complete - patient participated  Level of consciousness: sleepy but conscious  Pain score: 0  Airway patency: patent  Nausea & Vomiting: no nausea and no vomiting  Complications: no  Cardiovascular status: hemodynamically stable  Respiratory status: acceptable  Hydration status: euvolemic

## 2021-07-01 NOTE — OP NOTE
Endoscopic Procedure Note    Patient: Susan Pee: 1940  Med Rec#: [id number] Acc#: [id number]     Primary Care Provider JAMIR Fay  Referring Provider: Etienne Valle MD    Endoscopist: Julio Villanueva MD    Date of Procedure:  7/1/2021    Procedure:   1. EGD    Indications:   1. History of ampullary adenocarcinoma s/p whipple resection in 2/2020  2. Elevated CEA    Anesthesia:  Sedation was administered by anesthesia who monitored the patient during the procedure. Estimated Blood Loss: minimal    Procedure:   After reviewing the patient's chart and obtaining informed consent, the patient was placed in the left lateral decubitus position. A forward-viewing Olympus endoscope was lubricated and inserted through the mouth into the oropharynx. Under direct visualization, the upper esophagus was intubated. The scope was advanced to the level of the third portion of duodenum. Scope was slowly withdrawn with careful inspection of the mucosal surfaces. The scope was retroflexed for inspection of the gastric fundus and incisura. Findings and maneuvers are listed in impression below. The patient tolerated the procedure well. The scope was removed. There were no immediate complications. Findings:   Esophagus: abnormal: there is a minimal sized hiatal hernia. No obvious mucosal abnormalities seen. Stomach:  abnormal: in the distal stomach there was evidence of a patent and healthy appearing gastrojejunostomy. No ulceration or other lesions seen. Small Bowel: normal appearance. IMPRESSION:  1. No evidence of any obvious abnormality in the upper GI tract       RECOMMENDATIONS:    1.  I would suggest a colonoscopy to be scheduled at next available time (cancelled by patient today due to inability to tolerate the prep)    2. Follow-up as planned with Dr Remberto Jo office      The results were discussed with the patient and family.   A copy of the images obtained were given to the patient.      Shala Medina MD  7/1/2021  12:04 PM

## 2021-07-01 NOTE — H&P
Patient Name: Kimi Pathak  : 1940  MRN: [id number]  DATE: 21    Allergies: Allergies   Allergen Reactions    Daypro [Oxaprozin] Hives and Swelling    Iodides Other (See Comments)     X-ray dyes, turned red years ago    Norco [Hydrocodone-Acetaminophen] Other (See Comments)     Almost passed out    Quinapril Hcl Hives        ENDOSCOPY  History and Physical    Procedure:    [] Diagnostic Colonoscopy       [] Screening Colonoscopy  [x] EGD      [] ERCP      [] EUS       [] Other    [x] Previous office notes/History and Physical reviewed from the patients chart. Please see EMR for further details of HPI. I have examined the patient's status immediately prior to the procedure and:      Indications/HPI:    []abnormal labs, history of ampullary lesions     Anesthesia:   [x] MAC [] Moderate Sedation   [] General   [] None     ROS: 12 pt Review of Symptoms was negative unless mentioned above    Medications:   Prior to Admission medications    Medication Sig Start Date End Date Taking? Authorizing Provider   nitrofurantoin, macrocrystal-monohydrate, (MACROBID) 100 MG capsule Take 1 capsule by mouth 2 times daily 21  Yes Historical Provider, MD   Cholecalciferol (VITAMIN D3 PO) Take 1,000 Units by mouth every other day   Yes Historical Provider, MD   famotidine (PEPCID) 10 MG tablet Take 10 mg by mouth 2 times daily   Yes Historical Provider, MD   olmesartan-hydrochlorothiazide (BENICAR HCT) 40-25 MG per tablet Take 1 tablet by mouth daily   Yes Historical Provider, MD   levothyroxine (SYNTHROID) 25 MCG tablet Take 75 mcg by mouth Daily    Yes Historical Provider, MD   aspirin 81 MG EC tablet Take 81 mg by mouth daily. Yes Historical Provider, MD   rosuvastatin (CRESTOR) 10 MG tablet Take 10 mg by mouth daily.    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    Hyperlipidemia     Hypertension     Respiratory failure ([facility] 03/10/2014    Substance abuse ([facility]     tobacco    Thyroid disease        Past Surgical History:  Past Surgical History:   Procedure Laterality Date    CAROTID [facility]ARTERECTOMY  [facility], 03/09/09    LCE w/Dacron patch angioplasty    CHOLECYSTECTOMY  02/25/2020    Chronic cholecystitis, no gallstones    COLONOSCOPY  2011    [place], maybe a few polyps per patient, not cancerous    ENDOSCOPIC ULTRASOUND (UPPER)  01/27/2020    Dr Fofana-Ampullary mass, bile and pancreatic duct obstruction    HYSTERECTOMY, TOTAL ABDOMINAL      PANCREAS SURGERY  02/25/2020    Lap Whipple-Dr Grossman-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    SKIN CANCER EXCISION      right arm       Social History:  Social History     Tobacco Use    Smoking status: Current Every Day Smoker     Packs/day: 0.25     Years: 47.00     Pack years: 11.75    Smokeless tobacco: Never Used    Tobacco comment: now smoking 5-7 cigs per day x 1 year   Vaping Use    Vaping Use: Never used   Substance Use Topics    Alcohol use: No    Drug use: No       Vital Signs:   Vitals:    07/01/21 1038   BP: 133/66   Pulse: 77   Resp: 16   Temp: 98.1 °F (36.7 °C)   SpO2: 98%        Physical Exam:  Cardiac:  [x]WNL  []Comments:  Pulmonary:  [x]WNL   []Comments:  Neuro/Mental Status:  [x]WNL  []Comments:  Abdominal:  [x]WNL    []Comments:  Other:   []WNL  []Comments:    Informed Consent:  The risks and benefits of the procedure have been discussed with either the patient or if they cannot consent, their representative. Assessment:  Patient examined and appropriate for planned sedation and procedure. Plan:  Proceed with planned sedation and procedure as above.          Nelly Ervin MD

## 2021-07-01 NOTE — TELEPHONE ENCOUNTER
Shawn Kevin,    Per Dr Sagrario Henderson today:    RECOMMENDATIONS:    1.  I would suggest a colonoscopy to be scheduled at next available time (cancelled by patient today due to inability to tolerate the prep)     2. Follow-up as planned with Dr Tori Mccormick office        The results were discussed with the patient and family.   A copy of the images obtained were given to the patient.      Adolfo Beach MD  7/1/2021  12:04 PM

## 2021-07-01 NOTE — ANESTHESIA PRE PROCEDURE
Department of Anesthesiology  Preprocedure Note       Name:  Raymond Kincaid   Age:  [de-identified] y.o.  :  1940                                          MRN:  [id number]         Date:  2021      Surgeon: Elmira Olson):  Aissatou Colbert MD    Procedure: Procedure(s):  EGD BIOPSY    Medications prior to admission:   Prior to Admission medications    Medication Sig Start Date End Date Taking? Authorizing Provider   nitrofurantoin, macrocrystal-monohydrate, (MACROBID) 100 MG capsule Take 1 capsule by mouth 2 times daily 21  Yes Historical Provider, MD   Cholecalciferol (VITAMIN D3 PO) Take 1,000 Units by mouth every other day   Yes Historical Provider, MD   famotidine (PEPCID) 10 MG tablet Take 10 mg by mouth 2 times daily   Yes Historical Provider, MD   olmesartan-hydrochlorothiazide (BENICAR HCT) 40-25 MG per tablet Take 1 tablet by mouth daily   Yes Historical Provider, MD   levothyroxine (SYNTHROID) 25 MCG tablet Take 75 mcg by mouth Daily    Yes Historical Provider, MD   aspirin 81 MG EC tablet Take 81 mg by mouth daily. Yes Historical Provider, MD   rosuvastatin (CRESTOR) 10 MG tablet Take 10 mg by mouth daily. Yes Historical Provider, MD       Current medications:    Current Facility-Administered Medications   Medication Dose Route Frequency Provider Last Rate Last Admin    0.9 % sodium chloride infusion   Intravenous Continuous Aissatou Colbert MD           Allergies:     Allergies   Allergen Reactions    Daypro [Oxaprozin] Hives and Swelling    Iodides Other (See Comments)     X-ray dyes, turned red years ago    Norco [Hydrocodone-Acetaminophen] Other (See Comments)     Almost passed out    Quinapril Hcl Hives       Problem List:    Patient Active Problem List   Diagnosis Code    Hypertension I10    Substance abuse ([facility] F19.10    Carotid artery stenosis I65.29    Respiratory failure ([facility] J96.90    Cancer ([facility] C80.1    [facility] adenocarcinoma ([email or web address] C25.9    Elevated CEA R97.0       Past Medical History:        Diagnosis Date    Anemia     Carotid artery occlusion     Cataract of both eyes     [place] treating w/injections    Elevated CEA 03/23/2021    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    Hyperlipidemia     Hypertension     Respiratory failure ([facility] 03/10/2014    Substance abuse ([facility]     tobacco    Thyroid disease        Past Surgical History:        Procedure Laterality Date    CAR[facility]ECTOMY  [facility], 03[facility]09[facility]09    LCE w/Dacron patch angioplasty    CHOLECYSTECTOMY  02/25/2020    Chronic cholecystitis, no gallstones    COL[place]OSCOPY  2011    [place], maybe a few polyps per patient, not cancerous    ENDOSCOPIC ULTRASOUND (UPPER)  01/27/2020    Dr Fofana-Ampullary mass, bile and pancreatic duct obstruction    HYSTERECTOMY, TOTAL ABDOMINAL      PANCREAS SURGERY  02/25/2020    Lap Whipple-Dr Grossman-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                                            BP Readings from Last 3 Encounters:   06/08/21 130/78   03/23/21 (!) 142/80   09/01/20 120/82       NPO Status:                                                                                 BMI:   Wt Readings from Last 3 Encounters:   06/08/21 114 lb 9.6 oz (52 kg)   03/23/21 115 lb 12.8 oz (52.5 kg)   09/01/20 98 lb 12.8 oz (44.8 kg)     There is no height or weight on file to calculate BMI.    CBC:   Lab Results   Component Value Date    WBC 8.75 03/23/2021    RBC 3.83 03/23/2021    HGB 11.5 03/23/2021    HCT 34.0 03/23/2021    MCV 88.8 03/23/2021    RDW 15.5 03/23/2021     03/23/2021       CMP:   Lab Results   Component Value Date     03/23/2021    K 3.9 03/23/2021     03/23/2021    CO2 30 03/23/2021    BUN 26 03/23/2021    CREATININE 1.1 03/23/2021    GFRAA 60 01/26/2021    AGRATIO 1.4 01/26/2021    LABGLOM 48 03/23/2021    GLUCOSE 150 03/23/2021    PROT 7.0 03/23/2021    CALCIUM 9.7 03/23/2021    BILITOT <0.2 03/23/2021    ALKPHOS 74 03/23/2021    AST 30 03/23/2021    ALT 21 03/23/2021       POC Tests: No results for input(s): POCGLU, POCNA, POCK, POCCL, POCBUN, POCHEMO, POCHCT in the last 72 hours. Coags: No results found for: PROTIME, INR, APTT    HCG (If Applicable): No results found for: PREGTESTUR, PREGSERUM, HCG, HCGQUANT     ABGs: No results found for: PHART, PO2ART, TXF8WUW, YHG9UBP, BEART, Y9FVXIWX     Type & Screen (If Applicable):  No results found for: LABABO, LABRH    Drug/Infectious Status (If Applicable):  No results found for: HIV, HEPCAB    COVID-19 Screening (If Applicable): No results found for: COVID19        Anesthesia Evaluation  Patient summary reviewed and Nursing notes reviewed no history of anesthetic complications:   Airway: Mallampati: II  TM distance: >3 FB   Neck ROM: full  Mouth opening: > = 3 FB Dental: normal exam         Pulmonary:Negative Pulmonary ROS and normal exam                               Cardiovascular:  Exercise tolerance: good (>4 METS),   (+) hypertension:,                ROS comment: Hx carotid disease     Neuro/Psych:   Negative Neuro/Psych ROS              GI/Hepatic/Renal: Neg GI/Hepatic/Renal ROS            Endo/Other:    (+) malignancy/cancer. Abdominal:             Vascular: Other Findings:             Anesthesia Plan      general and TIVA     ASA 3       Induction: intravenous.       Anesthetic plan and risks discussed with patient.                       Heather Fisher, APRN - CRNA   7/1/2021

## 2021-07-07 ASSESSMENT — ENCOUNTER SYMPTOMS
VOMITING: 0
DIARRHEA: 0
RECTAL PAIN: 0
CHEST TIGHTNESS: 0
ABDOMINAL PAIN: 0
ROS SKIN COMMENTS: DRY SKIN
BACK PAIN: 0
NAUSEA: 0
COUGH: 0
BLOOD IN STOOL: 0
ALLERGIC/IMMUNOLOGIC NEGATIVE: 1
CONSTIPATION: 0
SORE THROAT: 0
VOICE CHANGE: 0
SHORTNESS OF BREATH: 0
EYES NEGATIVE: 1
TROUBLE SWALLOWING: 0

## 2021-07-14 NOTE — TELEPHONE ENCOUNTER
7/14/21 - spoke to patient - she does not want to schedule the colonoscopy. We discussed the current issues she is having and the lab results that resulted in her being scheduled.   She said at  this time she does not want to go through the prep or procedure, I let her know if she changes her mind, to please call our office and we will get her schedule - she voiced understanding

## 2021-12-16 ENCOUNTER — HOSPITAL ENCOUNTER (OUTPATIENT)
Dept: VASCULAR LAB | Age: 81
Discharge: HOME | End: 2021-12-16
Payer: MEDICARE

## 2021-12-16 ENCOUNTER — OFFICE VISIT (OUTPATIENT)
Dept: VASCULAR SURGERY | Age: 81
End: 2021-12-16
Payer: MEDICARE

## 2021-12-16 VITALS
DIASTOLIC BLOOD PRESSURE: 89 MMHG | HEIGHT: 60 IN | TEMPERATURE: 97.6 F | BODY MASS INDEX: 22.26 KG/M2 | SYSTOLIC BLOOD PRESSURE: 163 MMHG | HEART RATE: 88 BPM

## 2021-12-16 DIAGNOSIS — I65.23 BILATERAL CAROTID ARTERY STENOSIS: Primary | ICD-10-CM

## 2021-12-16 DIAGNOSIS — I65.23 BILATERAL CAROTID ARTERY STENOSIS: ICD-10-CM

## 2021-12-16 PROCEDURE — 93880 EXTRACRANIAL BILAT STUDY: CPT

## 2021-12-16 PROCEDURE — G8420 CALC BMI NORM PARAMETERS: HCPCS | Performed by: NURSE PRACTITIONER

## 2021-12-16 PROCEDURE — 4004F PT TOBACCO SCREEN RCVD TLK: CPT | Performed by: NURSE PRACTITIONER

## 2021-12-16 PROCEDURE — 1090F PRES/ABSN URINE INCON ASSESS: CPT | Performed by: NURSE PRACTITIONER

## 2021-12-16 PROCEDURE — G8484 FLU IMMUNIZE NO ADMIN: HCPCS | Performed by: NURSE PRACTITIONER

## 2021-12-16 PROCEDURE — G8427 DOCREV CUR MEDS BY ELIG CLIN: HCPCS | Performed by: NURSE PRACTITIONER

## 2021-12-16 PROCEDURE — G8400 PT W/DXA NO RESULTS DOC: HCPCS | Performed by: NURSE PRACTITIONER

## 2021-12-16 PROCEDURE — 1123F ACP DISCUSS/DSCN MKR DOCD: CPT | Performed by: NURSE PRACTITIONER

## 2021-12-16 PROCEDURE — 4040F PNEUMOC VAC/ADMIN/RCVD: CPT | Performed by: NURSE PRACTITIONER

## 2021-12-16 PROCEDURE — 99213 OFFICE O/P EST LOW 20 MIN: CPT | Performed by: NURSE PRACTITIONER

## 2021-12-16 NOTE — PROGRESS NOTES
Violette Nieves (:  1940) is a [de-identified] y.o. female,Established patient, here for evaluation of the following chief complaint(s):  Follow-up (Carotid f/u )            SUBJECTIVE/OBJECTIVE:  She presents for follow up of carotid artery stenosis. She has a known history of carotid artery stenosis for 1 - 5 years. Her current treatment includes ASA EC daily. She denies a history of CVA. She reports has not had TIA's, episodes of lateralizing weakness and episodes of amaurosis fugax. Violette Nieves is a [de-identified] y.o. female with the following history as recorded in [facility]:  Patient Active Problem List    Diagnosis Date Noted    Elevated CEA 2021    Pancreatic adenocarcinoma ([facility] [address] 2020    Cancer ([facility] 2020    Respiratory failure ([facility] 03/10/2014    Carotid artery stenosis 2013    Hypertension     Substance abuse (HCC)      Current Outpatient Medications   Medication Sig Dispense Refill    nitrofurantoin, macrocrystal-monohydrate, (MACROBID) 100 MG capsule Take 1 capsule by mouth 2 times daily      Cholecalciferol (VITAMIN D3 PO) Take 1,000 Units by mouth every other day      famotidine (PEPCID) 10 MG tablet Take 10 mg by mouth 2 times daily      olmesartan-hydrochlorothiazide (BENICAR HCT) 40-25 MG per tablet Take 1 tablet by mouth daily      levothyroxine (SYNTHROID) 25 MCG tablet Take 75 mcg by mouth Daily       aspirin 81 MG EC tablet Take 81 mg by mouth daily.  rosuvastatin (CRESTOR) 10 MG tablet Take 10 mg by mouth daily. No current facility-administered medications for this visit.      Allergies: Daypro [oxaprozin], Iodides, Norco [hydrocodone-acetaminophen], and Quinapril hcl  Past Medical History:   Diagnosis Date    Anemia     Carotid artery occlusion     Cataract of both eyes     [place] treating w/injections    Elevated CEA 2021    History of malignant neoplasm of ampulla of Vater 2020    Hyperlipidemia     Hypertension     Respiratory failure ([facility] [address] 03/10/2014    Substance abuse ([facility] [address]     tobacco    Thyroid disease      Past Surgical History:   Procedure Laterality Date    CAROTID ENDARTERECTOMY  [facility], 09    LCE w/Dacron patch angioplasty    CHOLECYSTECTOMY  2020    Chronic cholecystitis, no gallstones    COLONOSCOPY      [place], maybe a few polyps per patient, not cancerous    ENDOSCOPIC ULTRASOUND (UPPER)  2020    Dr Fofana-Ampullary mass, bile and pancreatic duct obstruction    EYE SURGERY Bilateral 12/15/2021    Dr. Dina Negrete in [place], [place]    [address] Bilateral 2021    Ruthie Colace Dr. 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    SKIN CANCER EXCISION      right arm    UPPER GASTROINTESTINAL ENDOSCOPY N/A 2021    Dr Felix Rico-Hiatal hernia, evidence of a patent and healthy appearing gastrojejunostomy in the distal stomach, recommend colonoscopy     Family History   Problem Relation Age of Onset    Other Mother          of pneumonia    Hypertension Father     Other Father          of cerebral bleed    Other Sister          of pneumonia    Colon Cancer Neg Hx     Colon Polyps Neg Hx     Esophageal Cancer Neg Hx     Liver Cancer Neg Hx     Liver Disease Neg Hx     Rectal Cancer Neg Hx     Stomach Cancer Neg Hx      Social History     Tobacco Use    Smoking status: Current Every Day Smoker     Packs/day: 0.25     Years: 47.00     Pack years: 11.75     Types: Cigarettes    Smokeless tobacco: Never Used    Tobacco comment: now smoking 5-7 cigs per day x 1 year   Substance Use Topics    Alcohol use: No         Eyes - no sudden vision change or amaurosis. Respiratory - no significant shortness of breath,  Cardiovascular - no chest pain or syncope. No  significant leg swelling. no claudication.   Musculoskeletal - no gait disturbance  Skin - no new wound. Neurologic -  No speech difficulty or lateralizing weakness. All other review of systems are negative. Physical Exam    BP (!) 163/89 (Site: Right Upper Arm, Position: Sitting, Cuff Size: Large Adult)   Pulse 88   Temp 97.6 °F (36.4 °C) (Temporal)   Ht 5' (1.524 m)   BMI 22.26 kg/m²       Neck- carotid pulses 2+ to palpation with no bruit  Cardiovascular - Regular rate and rhythm. Pulmonary - effort appears normal.  No respiratory distress. Lungs - Breath sounds normal. No wheezes or rales. Extremities - without edema   Neurologic - alert and oriented X 3. Physiologic. Face symmetric. Skin - warm, dry, and intact. no wound  Psychiatric - mood, affect, and behavior appear normal.  Judgment and thought processes appear normal.    Risk factors for atherosclerosis of all vascular beds have been reviewed with the patient including:  Family history, tobacco abuse in all forms, elevated cholesterol, hyperlipidemia, and diabetes. Doppler results:    Right CCA/ICA <50% stenotic  Left CCA/ICA widely patent  Right verterbral artery flow is antegrade  Left verterbral artery flow is antegrade  Individual velocities reviewed: Yes. Results were reviewed with the patient. Disease process is stable and chronic        Reviewed previous studies including: carotid u/s  Individual images were reviewed. I agree with the findings  Results were discussed with the patient. ASSESSMENT/PLAN:  1.  Bilateral carotid artery stenosis        Discussed management of carotid u/s which includes:  continue asa to reduce risk of TIA/CVA, to reduce risk of arterial thrombosis and to decrease rate of plaque buildup  Strongly encourage start/continue statin therapy -   Recommend no smoking - discussed the effect tobacco has on illness;   Proceed with asa ec daily         Patient instructed to call or proceed to the emergency room with any symptoms of lateralizing weakness, loss of vision in one eye, or episodes slurred speech. An electronic signature was used to authenticate this note.     --JAMIR Flores

## 2023-01-11 ENCOUNTER — HOSPITAL ENCOUNTER (OUTPATIENT)
Dept: VASCULAR LAB | Age: 83
Discharge: HOME | End: 2023-01-11
Payer: MEDICARE

## 2023-01-11 ENCOUNTER — OFFICE VISIT (OUTPATIENT)
Dept: VASCULAR SURGERY | Age: 83
End: 2023-01-11
Payer: MEDICARE

## 2023-01-11 VITALS
DIASTOLIC BLOOD PRESSURE: 89 MMHG | SYSTOLIC BLOOD PRESSURE: 162 MMHG | TEMPERATURE: 98.2 F | HEART RATE: 73 BPM | OXYGEN SATURATION: 99 %

## 2023-01-11 DIAGNOSIS — I65.23 BILATERAL CAROTID ARTERY STENOSIS: Primary | ICD-10-CM

## 2023-01-11 DIAGNOSIS — I65.23 BILATERAL CAROTID ARTERY STENOSIS: ICD-10-CM

## 2023-01-11 PROCEDURE — 3074F SYST BP LT 130 MM HG: CPT | Performed by: NURSE PRACTITIONER

## 2023-01-11 PROCEDURE — G8421 BMI NOT CALCULATED: HCPCS | Performed by: NURSE PRACTITIONER

## 2023-01-11 PROCEDURE — 3078F DIAST BP <80 MM HG: CPT | Performed by: NURSE PRACTITIONER

## 2023-01-11 PROCEDURE — 93880 EXTRACRANIAL BILAT STUDY: CPT

## 2023-01-11 PROCEDURE — G8484 FLU IMMUNIZE NO ADMIN: HCPCS | Performed by: NURSE PRACTITIONER

## 2023-01-11 PROCEDURE — G8400 PT W/DXA NO RESULTS DOC: HCPCS | Performed by: NURSE PRACTITIONER

## 2023-01-11 PROCEDURE — 1090F PRES/ABSN URINE INCON ASSESS: CPT | Performed by: NURSE PRACTITIONER

## 2023-01-11 PROCEDURE — 99213 OFFICE O/P EST LOW 20 MIN: CPT | Performed by: NURSE PRACTITIONER

## 2023-01-11 PROCEDURE — 4004F PT TOBACCO SCREEN RCVD TLK: CPT | Performed by: NURSE PRACTITIONER

## 2023-01-11 PROCEDURE — G8427 DOCREV CUR MEDS BY ELIG CLIN: HCPCS | Performed by: NURSE PRACTITIONER

## 2023-01-11 PROCEDURE — 1123F ACP DISCUSS/DSCN MKR DOCD: CPT | Performed by: NURSE PRACTITIONER

## 2023-01-12 NOTE — PROGRESS NOTES
Jorge Landis (:  1940) is a 81 y.o. female,Established patient, here for evaluation of the following chief complaint(s):  Follow-up (Follow up Carotid. )            SUBJECTIVE/OBJECTIVE:  She presents for follow up of carotid artery stenosis. She has a known history of carotid artery stenosis for 1 - 5 years. Her current treatment includes ASA EC daily. She denies a history of CVA. She reports has not had TIA's, episodes of lateralizing weakness and episodes of amaurosis fugax. Jorge Landis is a 81 y.o. female with the following history as recorded in [facility]:  Patient Active Problem List    Diagnosis Date Noted    Elevated CEA 2021    Pancreatic adenocarcinoma ([facility] [address] 2020    Cancer ([facility] 2020    Respiratory failure ([facility] 03/10/2014    Carotid artery stenosis 2013    Hypertension     Substance abuse (HCC)      Current Outpatient Medications   Medication Sig Dispense Refill    Cholecalciferol (VITAMIN D3 PO) Take 1,000 Units by mouth every other day      famotidine (PEPCID) 10 MG tablet Take 10 mg by mouth 2 times daily      olmesartan-hydrochlorothiazide (BENICAR HCT) 40-25 MG per tablet Take 1 tablet by mouth daily      levothyroxine (SYNTHROID) 25 MCG tablet Take 75 mcg by mouth Daily       aspirin 81 MG EC tablet Take 81 mg by mouth daily. rosuvastatin (CRESTOR) 10 MG tablet Take 10 mg by mouth daily. nitrofurantoin, macrocrystal-monohydrate, (MACROBID) 100 MG capsule Take 1 capsule by mouth 2 times daily (Patient not taking: Reported on 2023)       No current facility-administered medications for this visit.      Allergies: Daypro [oxaprozin], Iodides, Norco [hydrocodone-acetaminophen], and Quinapril hcl  Past Medical History:   Diagnosis Date    Anemia     Carotid artery occlusion     Cataract of both eyes     [place] treating w/injections    Elevated CEA 2021    History of malignant neoplasm of ampulla of Vater 2020    Hyperlipidemia Hypertension     Respiratory failure ([facility] [address] 03/10/2014    Substance abuse ([facility] [address]     tobacco    Thyroid disease      Past Surgical History:   Procedure Laterality Date    CAROTID ENDARTERECTOMY  [facility], 09    LCE w/Dacron patch angioplasty    CHOLECYSTECTOMY  2020    Chronic cholecystitis, no gallstones    COLONOSCOPY      [facility]inda, maybe a few polyps per patient, not cancerous    ENDOSCOPIC ULTRASOUND (UPPER)  2020    Dr Fofana-Ampullary mass, bile and pancreatic duct obstruction    EYE SURGERY Bilateral 12/15/2021    Dr. 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    SKIN CANCER EXCISION      right arm    UPPER GASTROINTESTINAL ENDOSCOPY N/A 2021    Dr Janene Rico-Hiatal hernia, evidence of a patent and healthy appearing gastrojejunostomy in the distal stomach, recommend colonoscopy     Family History   Problem Relation Age of Onset    Other Mother          of pneumonia    Hypertension Father     Other Father          of cerebral bleed    Other Sister          of pneumonia    Colon Cancer Neg Hx     Colon Polyps Neg Hx     Esophageal Cancer Neg Hx     Liver Cancer Neg Hx     Liver Disease Neg Hx     Rectal Cancer Neg Hx     Stomach Cancer Neg Hx      Social History     Tobacco Use    Smoking status: Every Day     Packs/day: 0.25     Years: 47.00     Pack years: 11.75     Types: Cigarettes    Smokeless tobacco: Never    Tobacco comments:     now smoking 5-7 cigs per day x 1 year   Substance Use Topics    Alcohol use: No         Eyes - no sudden vision change or amaurosis. Respiratory - no significant shortness of breath,  Cardiovascular - no chest pain or syncope. No  significant leg swelling. no claudication.   Musculoskeletal - no gait disturbance  Skin - no new wound. Neurologic -  No speech difficulty or lateralizing weakness. All other review of systems are negative. Physical Exam    BP (!) 162/89 (Site: Left Upper Arm, Position: Sitting, Cuff Size: Medium Adult)   Pulse 73   Temp 98.2 °F (36.8 °C)   SpO2 99%       Neck- carotid pulses 2+ to palpation with no bruit  Cardiovascular - Regular rate and rhythm. Pulmonary - effort appears normal.  No respiratory distress. Lungs - Breath sounds normal. No wheezes or rales. Extremities - without edema   Neurologic - alert and oriented X 3. Physiologic. Face symmetric. Skin - warm, dry, and intact. no wound  Psychiatric - mood, affect, and behavior appear normal.  Judgment and thought processes appear normal.    Risk factors for atherosclerosis of all vascular beds have been reviewed with the patient including:  Family history, tobacco abuse in all forms, elevated cholesterol, hyperlipidemia, and diabetes. Doppler results:    Right CCA/ICA <50% stenotic  Left CCA/ICA <50% stenotic  Right verterbral artery flow is antegrade  Left verterbral artery flow is antegrade  Individual velocities reviewed: Yes. Results were reviewed with the patient. Disease process is stable and chronic  by velocity criteria, I see no significant change          Reviewed previous studies including: carotid u/s  Individual images were reviewed. I agree with the findings  Results were discussed with the patient. ASSESSMENT/PLAN:  1. Bilateral carotid artery stenosis  -     VL DUP CAROTID BILATERAL;  Future        Discussed management of carotid u/s which includes:  continue asa to reduce risk of TIA/CVA, to reduce risk of arterial thrombosis, and to decrease rate of plaque buildup  Strongly encourage start/continue statin therapy -   Recommend no smoking - discussed the effect tobacco has on illness;   Proceed with 12 months with cvls         Patient instructed to call or proceed to the emergency room with any symptoms of lateralizing weakness, loss of vision in one eye, or episodes slurred speech. An electronic signature was used to authenticate this note.     --Lorena Griffiths APRN

## 2024-01-15 ENCOUNTER — TELEPHONE (OUTPATIENT)
Dept: VASCULAR SURGERY | Age: 84
End: 2024-01-15

## 2024-01-15 NOTE — TELEPHONE ENCOUNTER
Patient called to cancel testing and follow up appointment 1/16 for carotid and appointment with Teresa due to weather she was hoping to get her testing and follow up moved to same day as spouse on 2/22 but central scheduling stated there was not any availability. Patient asked me to call back to let her know if there was or was not and I called, she did not answer. Patient also stated she would call back to reschedule at another time if there was no availability for that day.     Thank you

## 2024-01-26 ENCOUNTER — TELEPHONE (OUTPATIENT)
Dept: VASCULAR SURGERY | Age: 84
End: 2024-01-26

## 2024-01-26 NOTE — TELEPHONE ENCOUNTER
I called the pt to get r/s and she can't come in right know she is taking care of her  and he has lots of test

## 2024-02-22 ENCOUNTER — OFFICE VISIT (OUTPATIENT)
Dept: VASCULAR SURGERY | Age: 84
End: 2024-02-22
Payer: MEDICARE

## 2024-02-22 VITALS
OXYGEN SATURATION: 98 % | HEART RATE: 76 BPM | DIASTOLIC BLOOD PRESSURE: 77 MMHG | TEMPERATURE: 97.8 F | SYSTOLIC BLOOD PRESSURE: 183 MMHG

## 2024-02-22 DIAGNOSIS — I65.23 BILATERAL CAROTID ARTERY STENOSIS: Primary | ICD-10-CM

## 2024-02-22 PROCEDURE — G8400 PT W/DXA NO RESULTS DOC: HCPCS | Performed by: NURSE PRACTITIONER

## 2024-02-22 PROCEDURE — 99213 OFFICE O/P EST LOW 20 MIN: CPT | Performed by: NURSE PRACTITIONER

## 2024-02-22 PROCEDURE — G8484 FLU IMMUNIZE NO ADMIN: HCPCS | Performed by: NURSE PRACTITIONER

## 2024-02-22 PROCEDURE — 3077F SYST BP >= 140 MM HG: CPT | Performed by: NURSE PRACTITIONER

## 2024-02-22 PROCEDURE — 3078F DIAST BP <80 MM HG: CPT | Performed by: NURSE PRACTITIONER

## 2024-02-22 PROCEDURE — G8427 DOCREV CUR MEDS BY ELIG CLIN: HCPCS | Performed by: NURSE PRACTITIONER

## 2024-02-22 PROCEDURE — 1123F ACP DISCUSS/DSCN MKR DOCD: CPT | Performed by: NURSE PRACTITIONER

## 2024-02-22 PROCEDURE — G8421 BMI NOT CALCULATED: HCPCS | Performed by: NURSE PRACTITIONER

## 2024-02-22 PROCEDURE — 1090F PRES/ABSN URINE INCON ASSESS: CPT | Performed by: NURSE PRACTITIONER

## 2024-02-22 PROCEDURE — 4004F PT TOBACCO SCREEN RCVD TLK: CPT | Performed by: NURSE PRACTITIONER

## 2024-02-22 NOTE — PROGRESS NOTES
Sandra Mckeon (:  1940) is a 83 y.o. female,Established patient, here for evaluation of the following chief complaint(s):  Follow-up            SUBJECTIVE/OBJECTIVE:  She presents for follow up of carotid artery stenosis.  She has a known history of carotid artery stenosis for 1 - 5 years. Her current treatment includes asa and crestor.She denies a history of CVA.  She reports has not had TIA's, episodes of lateralizing weakness and episodes of amaurosis fugax.    I have personally reviewed the following: problem list, current meds, allergies, PMH, PSH, family hx, and social hx  Sandra Mckeon is a 83 y.o. female with the following history as recorded in [facility]:  Patient Active Problem List    Diagnosis Date Noted    Elevated CEA 2021    Pancreatic adenocarcinoma (HCC) 2020    Cancer (HCC) 2020    Respiratory failure (HCC) 03/10/2014    Carotid artery stenosis 2013    Hypertension     Substance abuse (HCC)      Current Outpatient Medications   Medication Sig Dispense Refill    Cholecalciferol (VITAMIN D3 PO) Take 1,000 Units by mouth every other day      famotidine (PEPCID) 10 MG tablet Take 1 tablet by mouth 2 times daily      olmesartan-hydrochlorothiazide (BENICAR HCT) 40-25 MG per tablet Take 1 tablet by mouth daily      levothyroxine (SYNTHROID) 25 MCG tablet Take 3 tablets by mouth Daily      aspirin 81 MG EC tablet Take 1 tablet by mouth daily      rosuvastatin (CRESTOR) 10 MG tablet Take 1 tablet by mouth daily       No current facility-administered medications for this visit.     Allergies: Daypro [oxaprozin], Iodides, Norco [hydrocodone-acetaminophen], and Quinapril hcl  Past Medical History:   Diagnosis Date    Anemia     Carotid artery occlusion     Cataract of both eyes     [place] treating w/injections    Elevated CEA 2021    History of malignant neoplasm of ampulla of Vater 2020    Hyperlipidemia     Hypertension     Respiratory failure (HCC) 03/10/2014

## 2024-02-23 ENCOUNTER — TELEPHONE (OUTPATIENT)
Dept: VASCULAR SURGERY | Age: 84
End: 2024-02-23

## 2024-02-23 DIAGNOSIS — I65.23 BILATERAL CAROTID ARTERY STENOSIS: Primary | ICD-10-CM

## 2024-02-23 NOTE — TELEPHONE ENCOUNTER
Called and there was no way to leave a message for the patient that we have her scheduled for her carotid ultrasound @ [facility] on Wednesday, 02/28/2024 @ 11:00 AM.  I am faxing the orders over now and there is no pre-cert required.  I will continue to try to call her throughout the day.          ----- Message from JAMIR Robles sent at 2/22/2024 10:19 AM CST -----  Please schedule cvls next week at [facility] any day but tues

## 2024-03-05 ENCOUNTER — TELEPHONE (OUTPATIENT)
Dept: VASCULAR SURGERY | Age: 84
End: 2024-03-05

## 2024-03-05 NOTE — TELEPHONE ENCOUNTER
Called and spoke to the patient to let her know we have her scheduled for a carotid study for Thursday, 03/07/2024 @ 1:00 PM @ [facility].  The patient is aware of the date and time.  I let her know we would call her with the results.  If she didn't hear from us to contact us.  I am faxing over the order now.  The patient acknowledged.

## 2024-03-11 ENCOUNTER — TELEPHONE (OUTPATIENT)
Dept: VASCULAR SURGERY | Age: 84
End: 2024-03-11

## 2024-03-11 NOTE — TELEPHONE ENCOUNTER
Called patient with the following information below. Patient verbalized understanding. Will call patient when appointment is scheduled as well as put a reminder letter in the mail.          ----- Message from JAMIR Robles sent at 3/11/2024  5:49 AM CDT -----  Please let her know her study shows just over 50% in the right.  We will see her in 6 months

## 2024-09-16 ENCOUNTER — OFFICE VISIT (OUTPATIENT)
Dept: VASCULAR SURGERY | Age: 84
End: 2024-09-16
Payer: MEDICARE

## 2024-09-16 ENCOUNTER — HOSPITAL ENCOUNTER (OUTPATIENT)
Dept: VASCULAR LAB | Age: 84
Discharge: HOME | End: 2024-09-18
Payer: MEDICARE

## 2024-09-16 VITALS
OXYGEN SATURATION: 99 % | SYSTOLIC BLOOD PRESSURE: 162 MMHG | DIASTOLIC BLOOD PRESSURE: 90 MMHG | TEMPERATURE: 98 F | HEART RATE: 86 BPM

## 2024-09-16 DIAGNOSIS — I65.23 BILATERAL CAROTID ARTERY STENOSIS: ICD-10-CM

## 2024-09-16 DIAGNOSIS — I65.23 BILATERAL CAROTID ARTERY STENOSIS: Primary | ICD-10-CM

## 2024-09-16 LAB
VAS LEFT CCA MID EDV: 21.7 CM/S
VAS LEFT CCA MID PSV: 98.5 CM/S
VAS LEFT CCA PROX EDV: 19.3 CM/S
VAS LEFT CCA PROX PSV: 97.3 CM/S
VAS LEFT ECA EDV: 9.38 CM/S
VAS LEFT ECA PSV: 80.9 CM/S
VAS LEFT ICA DIST EDV: 22.3 CM/S
VAS LEFT ICA DIST PSV: 81.5 CM/S
VAS LEFT ICA MID EDV: 31.4 CM/S
VAS LEFT ICA MID PSV: 130 CM/S
VAS LEFT ICA PROX EDV: 25.8 CM/S
VAS LEFT ICA PROX PSV: 92 CM/S
VAS LEFT VERTEBRAL EDV: 17.3 CM/S
VAS LEFT VERTEBRAL PSV: 60.9 CM/S
VAS RIGHT CCA MID EDV: 19.3 CM/S
VAS RIGHT CCA MID PSV: 96.7 CM/S
VAS RIGHT CCA PROX EDV: 11.4 CM/S
VAS RIGHT CCA PROX PSV: 76.6 CM/S
VAS RIGHT ECA EDV: 22.6 CM/S
VAS RIGHT ECA PSV: 166 CM/S
VAS RIGHT ICA DIST EDV: 19.3 CM/S
VAS RIGHT ICA DIST PSV: 63.6 CM/S
VAS RIGHT ICA MID EDV: 24.4 CM/S
VAS RIGHT ICA MID PSV: 141 CM/S
VAS RIGHT ICA PROX EDV: 42.2 CM/S
VAS RIGHT ICA PROX PSV: 160 CM/S
VAS RIGHT VERTEBRAL EDV: 10.2 CM/S
VAS RIGHT VERTEBRAL PSV: 71.9 CM/S

## 2024-09-16 PROCEDURE — 3077F SYST BP >= 140 MM HG: CPT | Performed by: NURSE PRACTITIONER

## 2024-09-16 PROCEDURE — 4004F PT TOBACCO SCREEN RCVD TLK: CPT | Performed by: NURSE PRACTITIONER

## 2024-09-16 PROCEDURE — G8400 PT W/DXA NO RESULTS DOC: HCPCS | Performed by: NURSE PRACTITIONER

## 2024-09-16 PROCEDURE — 3079F DIAST BP 80-89 MM HG: CPT | Performed by: NURSE PRACTITIONER

## 2024-09-16 PROCEDURE — G8421 BMI NOT CALCULATED: HCPCS | Performed by: NURSE PRACTITIONER

## 2024-09-16 PROCEDURE — 1123F ACP DISCUSS/DSCN MKR DOCD: CPT | Performed by: NURSE PRACTITIONER

## 2024-09-16 PROCEDURE — 99214 OFFICE O/P EST MOD 30 MIN: CPT | Performed by: NURSE PRACTITIONER

## 2024-09-16 PROCEDURE — 93880 EXTRACRANIAL BILAT STUDY: CPT

## 2024-09-16 PROCEDURE — 1090F PRES/ABSN URINE INCON ASSESS: CPT | Performed by: NURSE PRACTITIONER

## 2024-09-16 PROCEDURE — 93880 EXTRACRANIAL BILAT STUDY: CPT | Performed by: SURGERY

## 2024-09-16 PROCEDURE — G8427 DOCREV CUR MEDS BY ELIG CLIN: HCPCS | Performed by: NURSE PRACTITIONER

## (undated) DEVICE — FORCEPS BX 240CM 2.4MM L NDL RAD JAW 4 M00513334